# Patient Record
Sex: FEMALE | Race: BLACK OR AFRICAN AMERICAN | NOT HISPANIC OR LATINO | URBAN - METROPOLITAN AREA
[De-identification: names, ages, dates, MRNs, and addresses within clinical notes are randomized per-mention and may not be internally consistent; named-entity substitution may affect disease eponyms.]

---

## 2017-04-06 ENCOUNTER — OUTPATIENT (OUTPATIENT)
Dept: OUTPATIENT SERVICES | Facility: HOSPITAL | Age: 32
LOS: 1 days | Discharge: HOME | End: 2017-04-06

## 2017-06-27 DIAGNOSIS — B20 HUMAN IMMUNODEFICIENCY VIRUS [HIV] DISEASE: ICD-10-CM

## 2017-06-29 ENCOUNTER — OUTPATIENT (OUTPATIENT)
Dept: OUTPATIENT SERVICES | Facility: HOSPITAL | Age: 32
LOS: 1 days | Discharge: HOME | End: 2017-06-29

## 2017-06-29 DIAGNOSIS — Z21 ASYMPTOMATIC HUMAN IMMUNODEFICIENCY VIRUS [HIV] INFECTION STATUS: ICD-10-CM

## 2017-06-29 DIAGNOSIS — B20 HUMAN IMMUNODEFICIENCY VIRUS [HIV] DISEASE: ICD-10-CM

## 2017-09-07 ENCOUNTER — RESULT REVIEW (OUTPATIENT)
Age: 32
End: 2017-09-07

## 2017-09-19 ENCOUNTER — EMERGENCY (EMERGENCY)
Facility: HOSPITAL | Age: 32
LOS: 0 days | Discharge: HOME | End: 2017-09-19
Admitting: INTERNAL MEDICINE
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
## 2017-09-19 DIAGNOSIS — S01.111A LACERATION WITHOUT FOREIGN BODY OF RIGHT EYELID AND PERIOCULAR AREA, INITIAL ENCOUNTER: ICD-10-CM
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
## 2017-09-19 DIAGNOSIS — Z21 ASYMPTOMATIC HUMAN IMMUNODEFICIENCY VIRUS [HIV] INFECTION STATUS: ICD-10-CM

## 2017-09-19 DIAGNOSIS — Z91.013 ALLERGY TO SEAFOOD: ICD-10-CM

## 2017-09-19 DIAGNOSIS — Z23 ENCOUNTER FOR IMMUNIZATION: ICD-10-CM

## 2017-09-19 DIAGNOSIS — Z88.0 ALLERGY STATUS TO PENICILLIN: ICD-10-CM
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
## 2017-09-25 ENCOUNTER — EMERGENCY (EMERGENCY)
Facility: HOSPITAL | Age: 32
LOS: 0 days | Discharge: HOME | End: 2017-09-25

## 2017-09-25 DIAGNOSIS — S01.111D LACERATION WITHOUT FOREIGN BODY OF RIGHT EYELID AND PERIOCULAR AREA, SUBSEQUENT ENCOUNTER: ICD-10-CM

## 2017-09-25 DIAGNOSIS — Z21 ASYMPTOMATIC HUMAN IMMUNODEFICIENCY VIRUS [HIV] INFECTION STATUS: ICD-10-CM

## 2017-09-25 DIAGNOSIS — Z88.0 ALLERGY STATUS TO PENICILLIN: ICD-10-CM

## 2017-09-25 DIAGNOSIS — N34.2 OTHER URETHRITIS: ICD-10-CM
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
## 2017-09-25 DIAGNOSIS — Z91.013 ALLERGY TO SEAFOOD: ICD-10-CM

## 2017-09-25 DIAGNOSIS — Z88.2 ALLERGY STATUS TO SULFONAMIDES: ICD-10-CM

## 2018-02-10 ENCOUNTER — EMERGENCY (EMERGENCY)
Facility: HOSPITAL | Age: 33
LOS: 1 days | Discharge: HOME | End: 2018-02-10

## 2018-02-10 VITALS
OXYGEN SATURATION: 99 % | SYSTOLIC BLOOD PRESSURE: 110 MMHG | RESPIRATION RATE: 18 BRPM | HEART RATE: 83 BPM | DIASTOLIC BLOOD PRESSURE: 73 MMHG | TEMPERATURE: 98 F

## 2018-02-10 DIAGNOSIS — R09.81 NASAL CONGESTION: ICD-10-CM

## 2018-03-01 ENCOUNTER — RESULT REVIEW (OUTPATIENT)
Age: 33
End: 2018-03-01

## 2018-03-20 ENCOUNTER — EMERGENCY (EMERGENCY)
Facility: HOSPITAL | Age: 33
LOS: 0 days | Discharge: HOME | End: 2018-03-20
Attending: EMERGENCY MEDICINE | Admitting: INTERNAL MEDICINE

## 2018-03-20 VITALS
HEART RATE: 73 BPM | DIASTOLIC BLOOD PRESSURE: 74 MMHG | SYSTOLIC BLOOD PRESSURE: 109 MMHG | OXYGEN SATURATION: 100 % | TEMPERATURE: 99 F | RESPIRATION RATE: 18 BRPM

## 2018-03-20 DIAGNOSIS — R07.89 OTHER CHEST PAIN: ICD-10-CM
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
## 2018-03-20 DIAGNOSIS — M94.0 CHONDROCOSTAL JUNCTION SYNDROME [TIETZE]: ICD-10-CM
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
## 2018-03-20 DIAGNOSIS — Z88.0 ALLERGY STATUS TO PENICILLIN: ICD-10-CM

## 2018-03-20 DIAGNOSIS — Z91.013 ALLERGY TO SEAFOOD: ICD-10-CM
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
## 2018-03-20 RX ORDER — METHOCARBAMOL 500 MG/1
1 TABLET, FILM COATED ORAL
Qty: 21 | Refills: 0
Start: 2018-03-20 | End: 2018-03-26

## 2018-03-20 RX ORDER — IBUPROFEN 200 MG
600 TABLET ORAL ONCE
Qty: 0 | Refills: 0 | Status: COMPLETED | OUTPATIENT
Start: 2018-03-20 | End: 2018-03-20

## 2018-03-20 RX ORDER — IBUPROFEN 200 MG
1 TABLET ORAL
Qty: 21 | Refills: 0
Start: 2018-03-20 | End: 2018-03-26

## 2018-03-20 RX ADMIN — Medication 600 MILLIGRAM(S): at 19:26

## 2018-03-20 NOTE — ED PROVIDER NOTE - NS ED ROS FT
Cardiac:  + CP , No SOB or edema. No chest pain with exertion.  Respiratory:  No cough or respiratory distress. No hemoptysis. No history of asthma or RAD.  GI:  No nausea, vomiting, diarrhea or abdominal pain.  MS:  No myalgia, muscle weakness, joint pain or back pain.  Neuro:  No headache or weakness.  No LOC.  Skin:  No skin rash.   Endocrine: No history of thyroid disease or diabetes.  Except as documented in the HPI,  all other systems are negative.

## 2018-03-20 NOTE — ED PROVIDER NOTE - PHYSICAL EXAMINATION
VITAL SIGNS: I have reviewed nursing notes and confirm.  CONSTITUTIONAL: Well-developed; well-nourished; in no acute distress.   SKIN: skin exam is warm and dry, no acute rash.    HEAD: Normocephalic; atraumatic.  EYES: conjunctiva and sclera clear.  ENT: No nasal discharge; airway clear.  CARD: S1, S2 normal; no murmurs, gallops, or rubs. Regular rate and rhythm.   RESP: Bilateral breath sounds, No wheezes, rales or rhonchi.  EXT: Normal ROM.  No clubbing, cyanosis or edema.   NEURO: Alert, oriented, grossly unremarkable  .

## 2018-03-20 NOTE — ED PROVIDER NOTE - OBJECTIVE STATEMENT
Pt is a 33y/o female with no sig pmhx presents today c/o midsternal, dull, non-radiating constant chest discomfort x 2 days. 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 Pt sts her pain is worsened with movement. Pt denies pleurisy, leg swelling, OCP use, recent long travel, recent surgery, hx of clots, family hx of Heart disease

## 2018-03-20 NOTE — ED PROVIDER NOTE - ATTENDING CONTRIBUTION TO CARE
31 yo female without significant PMH c/o anterior chest wall pain for 2 days, worse with movement, non-radiating, not associated with any symptoms.  Started a new job  1 week ago. 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  Denies any tobacco, alcohol, drug use, no hormonal therapy, no personal or family h/o PE/DVT, no PE/DVT risk factors, no recent illness, no change in exercise tolerance.  Well-appearing young female, NAD. pain reproducible with movemetn, rest of exam unremarkable, ECG and CXR WNL.  Instructed to follow up with PMD, take NSAIDS, strict return precautions given,.  patient verbalized understanding and is amenable with the plan.

## 2018-03-20 NOTE — ED ADULT NURSE NOTE - CHPI ED SYMPTOMS NEG
no cough/no nausea/no syncope/no chills/no dizziness/no vomiting/no fever/no diaphoresis/no shortness of breath

## 2018-05-09 ENCOUNTER — OUTPATIENT (OUTPATIENT)
Dept: OUTPATIENT SERVICES | Facility: HOSPITAL | Age: 33
LOS: 1 days | Discharge: HOME | End: 2018-05-09

## 2018-05-09 DIAGNOSIS — B20 HUMAN IMMUNODEFICIENCY VIRUS [HIV] DISEASE: ICD-10-CM

## 2018-08-11 ENCOUNTER — EMERGENCY (EMERGENCY)
Facility: HOSPITAL | Age: 33
LOS: 0 days | Discharge: HOME | End: 2018-08-11
Attending: STUDENT IN AN ORGANIZED HEALTH CARE EDUCATION/TRAINING PROGRAM | Admitting: STUDENT IN AN ORGANIZED HEALTH CARE EDUCATION/TRAINING PROGRAM

## 2018-08-11 VITALS — WEIGHT: 160.28 LBS | HEIGHT: 66 IN

## 2018-08-11 VITALS
SYSTOLIC BLOOD PRESSURE: 108 MMHG | OXYGEN SATURATION: 99 % | HEART RATE: 79 BPM | TEMPERATURE: 98 F | DIASTOLIC BLOOD PRESSURE: 66 MMHG | RESPIRATION RATE: 18 BRPM

## 2018-08-11 DIAGNOSIS — R10.9 UNSPECIFIED ABDOMINAL PAIN: ICD-10-CM

## 2018-08-11 DIAGNOSIS — Z79.899 OTHER LONG TERM (CURRENT) DRUG THERAPY: ICD-10-CM

## 2018-08-11 DIAGNOSIS — Z88.0 ALLERGY STATUS TO PENICILLIN: ICD-10-CM

## 2018-08-11 DIAGNOSIS — M79.651 PAIN IN RIGHT THIGH: ICD-10-CM

## 2018-08-11 LAB
ALBUMIN SERPL ELPH-MCNC: 4.2 G/DL — SIGNIFICANT CHANGE UP (ref 3.5–5.2)
ALP SERPL-CCNC: 54 U/L — SIGNIFICANT CHANGE UP (ref 30–115)
ALT FLD-CCNC: 13 U/L — SIGNIFICANT CHANGE UP (ref 0–41)
ANION GAP SERPL CALC-SCNC: 14 MMOL/L — SIGNIFICANT CHANGE UP (ref 7–14)
APPEARANCE UR: ABNORMAL
AST SERPL-CCNC: 14 U/L — SIGNIFICANT CHANGE UP (ref 0–41)
BACTERIA # UR AUTO: ABNORMAL /HPF
BASOPHILS # BLD AUTO: 0.04 K/UL — SIGNIFICANT CHANGE UP (ref 0–0.2)
BASOPHILS NFR BLD AUTO: 0.5 % — SIGNIFICANT CHANGE UP (ref 0–1)
BILIRUB SERPL-MCNC: 0.4 MG/DL — SIGNIFICANT CHANGE UP (ref 0.2–1.2)
BILIRUB UR-MCNC: NEGATIVE — SIGNIFICANT CHANGE UP
BUN SERPL-MCNC: 14 MG/DL — SIGNIFICANT CHANGE UP (ref 10–20)
CALCIUM SERPL-MCNC: 9.2 MG/DL — SIGNIFICANT CHANGE UP (ref 8.5–10.1)
CHLORIDE SERPL-SCNC: 98 MMOL/L — SIGNIFICANT CHANGE UP (ref 98–110)
CO2 SERPL-SCNC: 24 MMOL/L — SIGNIFICANT CHANGE UP (ref 17–32)
COLOR SPEC: YELLOW — SIGNIFICANT CHANGE UP
CREAT SERPL-MCNC: 0.8 MG/DL — SIGNIFICANT CHANGE UP (ref 0.7–1.5)
DIFF PNL FLD: ABNORMAL
EOSINOPHIL # BLD AUTO: 0.08 K/UL — SIGNIFICANT CHANGE UP (ref 0–0.7)
EOSINOPHIL NFR BLD AUTO: 1 % — SIGNIFICANT CHANGE UP (ref 0–8)
EPI CELLS # UR: ABNORMAL /HPF
GLUCOSE SERPL-MCNC: 99 MG/DL — SIGNIFICANT CHANGE UP (ref 70–99)
GLUCOSE UR QL: NEGATIVE MG/DL — SIGNIFICANT CHANGE UP
HCG UR QL: NEGATIVE — SIGNIFICANT CHANGE UP
HCT VFR BLD CALC: 33.7 % — LOW (ref 37–47)
HGB BLD-MCNC: 11.4 G/DL — LOW (ref 12–16)
IMM GRANULOCYTES NFR BLD AUTO: 0.4 % — HIGH (ref 0.1–0.3)
KETONES UR-MCNC: NEGATIVE — SIGNIFICANT CHANGE UP
LEUKOCYTE ESTERASE UR-ACNC: NEGATIVE — SIGNIFICANT CHANGE UP
LYMPHOCYTES # BLD AUTO: 2.15 K/UL — SIGNIFICANT CHANGE UP (ref 1.2–3.4)
LYMPHOCYTES # BLD AUTO: 27.8 % — SIGNIFICANT CHANGE UP (ref 20.5–51.1)
MCHC RBC-ENTMCNC: 31.2 PG — HIGH (ref 27–31)
MCHC RBC-ENTMCNC: 33.8 G/DL — SIGNIFICANT CHANGE UP (ref 32–37)
MCV RBC AUTO: 92.3 FL — SIGNIFICANT CHANGE UP (ref 81–99)
MONOCYTES # BLD AUTO: 0.57 K/UL — SIGNIFICANT CHANGE UP (ref 0.1–0.6)
MONOCYTES NFR BLD AUTO: 7.4 % — SIGNIFICANT CHANGE UP (ref 1.7–9.3)
NEUTROPHILS # BLD AUTO: 4.87 K/UL — SIGNIFICANT CHANGE UP (ref 1.4–6.5)
NEUTROPHILS NFR BLD AUTO: 62.9 % — SIGNIFICANT CHANGE UP (ref 42.2–75.2)
NITRITE UR-MCNC: NEGATIVE — SIGNIFICANT CHANGE UP
NRBC # BLD: 0 /100 WBCS — SIGNIFICANT CHANGE UP (ref 0–0)
PH UR: 6 — SIGNIFICANT CHANGE UP (ref 5–8)
PLATELET # BLD AUTO: 270 K/UL — SIGNIFICANT CHANGE UP (ref 130–400)
POTASSIUM SERPL-MCNC: 3.9 MMOL/L — SIGNIFICANT CHANGE UP (ref 3.5–5)
POTASSIUM SERPL-SCNC: 3.9 MMOL/L — SIGNIFICANT CHANGE UP (ref 3.5–5)
PROT SERPL-MCNC: 7 G/DL — SIGNIFICANT CHANGE UP (ref 6–8)
PROT UR-MCNC: NEGATIVE MG/DL — SIGNIFICANT CHANGE UP
RBC # BLD: 3.65 M/UL — LOW (ref 4.2–5.4)
RBC # FLD: 11.1 % — LOW (ref 11.5–14.5)
RBC CASTS # UR COMP ASSIST: SIGNIFICANT CHANGE UP /HPF
SODIUM SERPL-SCNC: 136 MMOL/L — SIGNIFICANT CHANGE UP (ref 135–146)
SP GR SPEC: 1.02 — SIGNIFICANT CHANGE UP (ref 1.01–1.03)
UROBILINOGEN FLD QL: 0.2 MG/DL — SIGNIFICANT CHANGE UP (ref 0.2–0.2)
WBC # BLD: 7.74 K/UL — SIGNIFICANT CHANGE UP (ref 4.8–10.8)
WBC # FLD AUTO: 7.74 K/UL — SIGNIFICANT CHANGE UP (ref 4.8–10.8)

## 2018-08-11 RX ORDER — METHOCARBAMOL 500 MG/1
4 TABLET, FILM COATED ORAL
Qty: 112 | Refills: 0
Start: 2018-08-11 | End: 2018-08-24

## 2018-08-11 RX ORDER — IBUPROFEN 200 MG
600 TABLET ORAL ONCE
Qty: 0 | Refills: 0 | Status: COMPLETED | OUTPATIENT
Start: 2018-08-11 | End: 2018-08-11

## 2018-08-11 RX ADMIN — Medication 600 MILLIGRAM(S): at 10:40

## 2018-08-11 RX ADMIN — Medication 600 MILLIGRAM(S): at 11:25

## 2018-08-11 NOTE — ED PROVIDER NOTE - ATTENDING CONTRIBUTION TO CARE
34 y/o F no sig pmh p/w R sided atraumatic back pain at posterior iliac crest.  + some pain radiating down buttock and into posterior leg.  No  sx, fever, weakness or numbness. pain worse w/ movement, better in some position. ROS PE above.  no spinal ttp, no cvat; pain to area indicated w/ position change. neuro non focal.  IMP: MSK pain.  analgesia, reassess.

## 2018-08-11 NOTE — ED PROVIDER NOTE - NS ED ROS FT
GEN:  no fever, no chills, no fatigue  NEURO:  no headache, no dizziness  ENT: no sore throat, no runny nose  CV:  no chest pain, no SOB  RESP:  no dyspnea, no cough  GI:  no nausea, no vomiting, no abdominal pain, no diarrhea, no constipation  :  no dysuria, no urinary frequency, no hematuria  MSK:  no joint pain, no edema  SKIN:  no rash, no bruising  HEME: no hematochezia, no melena

## 2018-08-11 NOTE — ED ADULT NURSE NOTE - CHPI ED NUR SYMPTOMS NEG
no dizziness/no nausea/no chills/no decreased eating/drinking/no weakness/no vomiting/no tingling/no fever

## 2018-08-11 NOTE — ED PROVIDER NOTE - PHYSICAL EXAMINATION
CONSTITUTIONAL: well developed, nontoxic appearing, in no acute distress, speaking in full sentences  SKIN: warm, dry, no rash, cap refill < 2 seconds  HEENT: normocephalic, atraumatic, no conjunctival erythema, moist mucous membranes, patent airway  NECK: supple, no masses  CV:  regular rate, regular rhythm, no murmur, 2+ radial and DP pulses bilaterally  RESP: no wheezes, no rales, no rhonchi, normal work of breathing  BACK: no CVA tenderness  ABD: soft, nontender, nondistended, no rebound, no guarding, normoactive bowel sounds, negative obturators sign, negative psoas sign  MSK: normal ROM, no cyanosis, no edema  NEURO: alert, oriented, grossly unremarkable  PSYCH: cooperative, appropriate

## 2018-08-11 NOTE — ED PROVIDER NOTE - OBJECTIVE STATEMENT
34 yo F with no PMHx who presents with gradual onset of constant, severe, dull R sided flank/hip pain radiating down leg x 4 days. Pt unsure if she slept on her child's toy at night. Pain worse with bending over or taking deep breaths, improved with motrin. No fever, nausea, vomiting, dysuria, diarrhea, constipation, blood in stool. Currently menstruating. No OCP use, recent travel, heavy lifting, trauma. No hx of abd surgeries. 34 yo F with no PMHx who presents with gradual onset of constant, severe, dull R sided flank/hip pain radiating down leg x 4 days. Pain worse with bending over or taking deep breaths, improved with motrin. No fever, nausea, vomiting, dysuria, diarrhea, constipation, blood in stool. Currently menstruating. Pt unsure if she slept on her child's toy at night. No OCP use, recent travel, heavy lifting, trauma. No hx of abd surgeries.

## 2018-08-11 NOTE — ED PROVIDER NOTE - MEDICAL DECISION MAKING DETAILS
Pain improved in ED.  Labs reassuring,  consider MSK.  Pt stable for dc w/ PMD f/up, and care as discussed.  MM relaxant sent to pharmacy.  Pt/ family understands plan and signs and symptoms for ED return.

## 2018-08-11 NOTE — ED PROVIDER NOTE - PROGRESS NOTE DETAILS
32 yo F presenting with R hip/flank pain. No tenderness on exam. Less likely secondary to appendicitis, ovarian torsion, ectopic pregnancy, psoas abscess, pyelonephritis. Possibly kidney stone vs musculoskeletal etiology. Ordered labs, UA, analgesia. 34 yo F presenting with R hip/flank pain. No tenderness on exam. Less likely secondary to appendicitis, ovarian torsion, ectopic pregnancy, psoas abscess, pyelonephritis, PE, diverticulitis, colitis. Possibly kidney stone vs musculoskeletal etiology. Ordered labs, UA, analgesia. Labs neg, UA neg, upreg neg. Pt reports pain improved after ibuprofen. Educated pt on taking NSAIDs and given script for 2 week supply of robaxin. Pt agreeable with plan to dc home and will follow up with her PCP as needed. 34 yo F presenting with R hip/flank pain. No tenderness on exam. Less likely secondary to appendicitis, ovarian torsion, ectopic pregnancy, psoas abscess, pyelonephritis, PE, diverticulitis, colitis, sciatic, radiculopathy. Possibly kidney stone vs musculoskeletal etiology. Ordered labs, UA, analgesia.

## 2018-08-12 LAB
CULTURE RESULTS: SIGNIFICANT CHANGE UP
SPECIMEN SOURCE: SIGNIFICANT CHANGE UP

## 2018-09-27 ENCOUNTER — OUTPATIENT (OUTPATIENT)
Dept: OUTPATIENT SERVICES | Facility: HOSPITAL | Age: 33
LOS: 1 days | Discharge: HOME | End: 2018-09-27

## 2018-09-27 DIAGNOSIS — B20 HUMAN IMMUNODEFICIENCY VIRUS [HIV] DISEASE: ICD-10-CM

## 2019-03-14 ENCOUNTER — OUTPATIENT (OUTPATIENT)
Dept: OUTPATIENT SERVICES | Facility: HOSPITAL | Age: 34
LOS: 1 days | Discharge: HOME | End: 2019-03-14

## 2019-03-14 DIAGNOSIS — B20 HUMAN IMMUNODEFICIENCY VIRUS [HIV] DISEASE: ICD-10-CM

## 2019-04-01 ENCOUNTER — EMERGENCY (EMERGENCY)
Facility: HOSPITAL | Age: 34
LOS: 0 days | Discharge: HOME | End: 2019-04-01
Attending: EMERGENCY MEDICINE | Admitting: EMERGENCY MEDICINE
Payer: MEDICAID

## 2019-04-01 ENCOUNTER — OUTPATIENT (OUTPATIENT)
Dept: OUTPATIENT SERVICES | Facility: HOSPITAL | Age: 34
LOS: 1 days | End: 2019-04-01
Payer: MEDICAID

## 2019-04-01 VITALS — HEIGHT: 66 IN | WEIGHT: 160.06 LBS

## 2019-04-01 VITALS
DIASTOLIC BLOOD PRESSURE: 58 MMHG | HEART RATE: 74 BPM | RESPIRATION RATE: 18 BRPM | OXYGEN SATURATION: 100 % | TEMPERATURE: 98 F | SYSTOLIC BLOOD PRESSURE: 118 MMHG

## 2019-04-01 DIAGNOSIS — R07.89 OTHER CHEST PAIN: ICD-10-CM

## 2019-04-01 DIAGNOSIS — M54.5 LOW BACK PAIN: ICD-10-CM

## 2019-04-01 DIAGNOSIS — R53.83 OTHER FATIGUE: ICD-10-CM

## 2019-04-01 DIAGNOSIS — R20.2 PARESTHESIA OF SKIN: ICD-10-CM

## 2019-04-01 DIAGNOSIS — R63.0 ANOREXIA: ICD-10-CM

## 2019-04-01 DIAGNOSIS — M79.602 PAIN IN LEFT ARM: ICD-10-CM

## 2019-04-01 DIAGNOSIS — R20.0 ANESTHESIA OF SKIN: ICD-10-CM

## 2019-04-01 DIAGNOSIS — Z79.1 LONG TERM (CURRENT) USE OF NON-STEROIDAL ANTI-INFLAMMATORIES (NSAID): ICD-10-CM

## 2019-04-01 DIAGNOSIS — Z91.013 ALLERGY TO SEAFOOD: ICD-10-CM

## 2019-04-01 DIAGNOSIS — M54.2 CERVICALGIA: ICD-10-CM

## 2019-04-01 DIAGNOSIS — E78.5 HYPERLIPIDEMIA, UNSPECIFIED: ICD-10-CM

## 2019-04-01 DIAGNOSIS — Z88.0 ALLERGY STATUS TO PENICILLIN: ICD-10-CM

## 2019-04-01 LAB
ALBUMIN SERPL ELPH-MCNC: 4.1 G/DL — SIGNIFICANT CHANGE UP (ref 3.5–5.2)
ALP SERPL-CCNC: 64 U/L — SIGNIFICANT CHANGE UP (ref 30–115)
ALT FLD-CCNC: 19 U/L — SIGNIFICANT CHANGE UP (ref 0–41)
ANION GAP SERPL CALC-SCNC: 10 MMOL/L — SIGNIFICANT CHANGE UP (ref 7–14)
AST SERPL-CCNC: 16 U/L — SIGNIFICANT CHANGE UP (ref 0–41)
BASOPHILS # BLD AUTO: 0.03 K/UL — SIGNIFICANT CHANGE UP (ref 0–0.2)
BASOPHILS NFR BLD AUTO: 0.5 % — SIGNIFICANT CHANGE UP (ref 0–1)
BILIRUB SERPL-MCNC: 0.3 MG/DL — SIGNIFICANT CHANGE UP (ref 0.2–1.2)
BUN SERPL-MCNC: 14 MG/DL — SIGNIFICANT CHANGE UP (ref 10–20)
CALCIUM SERPL-MCNC: 9.2 MG/DL — SIGNIFICANT CHANGE UP (ref 8.5–10.1)
CHLORIDE SERPL-SCNC: 102 MMOL/L — SIGNIFICANT CHANGE UP (ref 98–110)
CO2 SERPL-SCNC: 23 MMOL/L — SIGNIFICANT CHANGE UP (ref 17–32)
CREAT SERPL-MCNC: 0.8 MG/DL — SIGNIFICANT CHANGE UP (ref 0.7–1.5)
EOSINOPHIL # BLD AUTO: 0.16 K/UL — SIGNIFICANT CHANGE UP (ref 0–0.7)
EOSINOPHIL NFR BLD AUTO: 2.4 % — SIGNIFICANT CHANGE UP (ref 0–8)
GLUCOSE SERPL-MCNC: 102 MG/DL — HIGH (ref 70–99)
HCG SERPL QL: NEGATIVE — SIGNIFICANT CHANGE UP
HCT VFR BLD CALC: 34.1 % — LOW (ref 37–47)
HGB BLD-MCNC: 11.4 G/DL — LOW (ref 12–16)
IMM GRANULOCYTES NFR BLD AUTO: 0.5 % — HIGH (ref 0.1–0.3)
LIDOCAIN IGE QN: 67 U/L — HIGH (ref 7–60)
LYMPHOCYTES # BLD AUTO: 2.28 K/UL — SIGNIFICANT CHANGE UP (ref 1.2–3.4)
LYMPHOCYTES # BLD AUTO: 34.8 % — SIGNIFICANT CHANGE UP (ref 20.5–51.1)
MAGNESIUM SERPL-MCNC: 1.8 MG/DL — SIGNIFICANT CHANGE UP (ref 1.8–2.4)
MCHC RBC-ENTMCNC: 30.5 PG — SIGNIFICANT CHANGE UP (ref 27–31)
MCHC RBC-ENTMCNC: 33.4 G/DL — SIGNIFICANT CHANGE UP (ref 32–37)
MCV RBC AUTO: 91.2 FL — SIGNIFICANT CHANGE UP (ref 81–99)
MONOCYTES # BLD AUTO: 0.51 K/UL — SIGNIFICANT CHANGE UP (ref 0.1–0.6)
MONOCYTES NFR BLD AUTO: 7.8 % — SIGNIFICANT CHANGE UP (ref 1.7–9.3)
NEUTROPHILS # BLD AUTO: 3.54 K/UL — SIGNIFICANT CHANGE UP (ref 1.4–6.5)
NEUTROPHILS NFR BLD AUTO: 54 % — SIGNIFICANT CHANGE UP (ref 42.2–75.2)
NRBC # BLD: 0 /100 WBCS — SIGNIFICANT CHANGE UP (ref 0–0)
PLATELET # BLD AUTO: 287 K/UL — SIGNIFICANT CHANGE UP (ref 130–400)
POTASSIUM SERPL-MCNC: 3.7 MMOL/L — SIGNIFICANT CHANGE UP (ref 3.5–5)
POTASSIUM SERPL-SCNC: 3.7 MMOL/L — SIGNIFICANT CHANGE UP (ref 3.5–5)
PROT SERPL-MCNC: 7.2 G/DL — SIGNIFICANT CHANGE UP (ref 6–8)
RBC # BLD: 3.74 M/UL — LOW (ref 4.2–5.4)
RBC # FLD: 11.6 % — SIGNIFICANT CHANGE UP (ref 11.5–14.5)
SODIUM SERPL-SCNC: 135 MMOL/L — SIGNIFICANT CHANGE UP (ref 135–146)
TROPONIN T SERPL-MCNC: <0.01 NG/ML — SIGNIFICANT CHANGE UP
WBC # BLD: 6.55 K/UL — SIGNIFICANT CHANGE UP (ref 4.8–10.8)
WBC # FLD AUTO: 6.55 K/UL — SIGNIFICANT CHANGE UP (ref 4.8–10.8)

## 2019-04-01 PROCEDURE — 99285 EMERGENCY DEPT VISIT HI MDM: CPT

## 2019-04-01 PROCEDURE — G9001: CPT

## 2019-04-01 PROCEDURE — 71045 X-RAY EXAM CHEST 1 VIEW: CPT | Mod: 26

## 2019-04-01 RX ORDER — SODIUM CHLORIDE 9 MG/ML
1000 INJECTION INTRAMUSCULAR; INTRAVENOUS; SUBCUTANEOUS ONCE
Qty: 0 | Refills: 0 | Status: COMPLETED | OUTPATIENT
Start: 2019-04-01 | End: 2019-04-01

## 2019-04-01 RX ORDER — KETOROLAC TROMETHAMINE 30 MG/ML
30 SYRINGE (ML) INJECTION ONCE
Qty: 0 | Refills: 0 | Status: DISCONTINUED | OUTPATIENT
Start: 2019-04-01 | End: 2019-04-01

## 2019-04-01 RX ORDER — FAMOTIDINE 10 MG/ML
20 INJECTION INTRAVENOUS ONCE
Qty: 0 | Refills: 0 | Status: DISCONTINUED | OUTPATIENT
Start: 2019-04-01 | End: 2019-04-01

## 2019-04-01 RX ADMIN — SODIUM CHLORIDE 1000 MILLILITER(S): 9 INJECTION INTRAMUSCULAR; INTRAVENOUS; SUBCUTANEOUS at 20:57

## 2019-04-01 RX ADMIN — Medication 30 MILLIGRAM(S): at 20:57

## 2019-04-01 RX ADMIN — Medication 30 MILLIGRAM(S): at 21:15

## 2019-04-01 NOTE — ED ADULT NURSE NOTE - CHPI ED NUR SYMPTOMS NEG
no dizziness/no syncope/no fever/no vomiting/no congestion/no back pain/no chills/no nausea/no shortness of breath/no diaphoresis

## 2019-04-01 NOTE — ED PROVIDER NOTE - PHYSICAL EXAMINATION
CONSTITUTIONAL: Well-developed; well-nourished; in no acute distress.   SKIN: warm, dry  HEAD: Normocephalic; atraumatic.  EYES: no conjunctival injection. PERRL.   ENT: No nasal discharge; airway clear.  NECK: Supple; non tender.  CARD: S1, S2 normal; no murmurs, gallops, or rubs. Regular rate and rhythm.   RESP: No wheezes, rales or rhonchi.  ABD: soft ntnd  EXT: +Spurling Maneuver, + should abduction relief test, Normal ROM.  No clubbing, cyanosis or edema.   LYMPH: No acute cervical adenopathy.  NEURO: Alert, oriented, CN 2-12 grossly in tact, 5/5 strength of RUE, 4/5 strength of LUE, 5/5 strength b/l LE,   PSYCH: Cooperative, appropriate. CONSTITUTIONAL: Well-developed; well-nourished; in no acute distress.   SKIN: warm, dry  HEAD: Normocephalic; atraumatic.  EYES: no conjunctival injection. PERRL.   ENT: No nasal discharge; airway clear.  NECK: Supple; non tender.  CARD: S1, S2 normal; no murmurs, gallops, or rubs. Regular rate and rhythm.   RESP: No wheezes, rales or rhonchi.  ABD: soft ntnd  EXT: +Spurling Maneuver, + should abduction relief test, Normal ROM.  No clubbing, cyanosis or edema.   LYMPH: No acute cervical adenopathy.  NEURO: Alert, oriented, CN 2-12 grossly in tact, 4/5 strength of LUE, 5/5 strength of RUE, 5/5 strength b/l LE  PSYCH: Cooperative, appropriate.

## 2019-04-01 NOTE — ED PROVIDER NOTE - CARE PROVIDER_API CALL
Navarro Weinstein)  Internal Medicine  [address] [place] [address]  Phone: [phone]  Fax: [phone]  Follow Up Time:

## 2019-04-01 NOTE — ED PROVIDER NOTE - ATTENDING CONTRIBUTION TO CARE
33F PMH HL not on meds, p/w cp and L arm heaviness. started sat evening at "excrutiating heart burn" which resolved after 8 hrs after taking zantac, but woke up the next day w LUE heaviness/pain/tingling. pt is concerned she might be having a heart attack. no longer has cp.  no abd pain, nvdc. no fever, cough. no trauma. no neck pain, ha. no numbness, weakness. no tearing bp. no le edema, le pain, immobilization, hormones, hemoptysis. feels tired, dec appetite. 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 no si, hi ,hallucinations. pmd dr barlow. no dysuria, freq, hematuria. never smoker. no fam hx cad or scd.     on exam, AFVSS, well jarrett nad, ncat, eomi, perrla, DRY mm, lctab, rrr nl s1s2 no mrg, abd soft ntnd, aaox3, CN 2-12 intact, No nystagmus.  5/5 motor x 4 ext, SILT x 4 extremities, No facial droop or slurred speech. No pronator drift.  Normal rapid alternating movement and finger nose finger bilaterally. No midline C/T/L tenderness to palpation or step off. Normal gait, No ataxia. , no le edema or calf ttp,     a/p;  CP/L pain, NV intact, low risk ACS. hx untreated HL. will do labs, ekg/trop, CXR, toradol, ivf, reeval. PERC neg. H&P n ot consistent w pe, dissection, esoph perf, tamponade, ptxn pna. r/o acs.

## 2019-04-01 NOTE — ED PROVIDER NOTE - NSFOLLOWUPINSTRUCTIONS_ED_ALL_ED_FT
Chest Pain    Chest pain can be caused by many different conditions which may or may not be dangerous. Causes include heartburn, lung infections, heart attack, blood clot in lungs, skin infections, strain or damage to muscle, cartilage, or bones, etc. In addition to a history and physical examination, an electrocardiogram (ECG) or other lab tests may have been performed to determine the cause of your chest pain. Follow up with your primary care provider or with a cardiologist as instructed.     SEEK IMMEDIATE MEDICAL CARE IF YOU HAVE ANY OF THE FOLLOWING SYMPTOMS: worsening chest pain, coughing up blood, unexplained back/neck/jaw pain, severe abdominal pain, dizziness or lightheadedness, fainting, shortness of breath, sweaty or clammy skin, vomiting, or racing heart beat. These symptoms may represent a serious problem that is an emergency. Do not wait to see if the symptoms will go away. Get medical help right away. Call 911 and do not drive yourself to the hospital.      Arm Pain    WHAT YOU NEED TO KNOW:    Your arm pain may be caused by a number of conditions. Examples include arthritis, nerve problems, or an awkward position while you sleep. X-rays did not show a broken bone in your arm or wrist. Arm pain may be a sign of a serious condition that needs immediate care, such as a heart attack.    DISCHARGE INSTRUCTIONS:    Call 911 for any of the following: You have any of the following signs of a heart attack:     Squeezing, pressure, or pain in your chest that lasts longer than 5 minutes or returns    Discomfort or pain in your back, neck, jaw, stomach, or arm     Trouble breathing or a fast, fluttery heartbeat    Nausea or vomiting    Lightheadedness or a sudden cold sweat, especially with chest pain or trouble breathing    Return to the emergency department if:     You have severe pain, or pain that spreads from your arm to other areas.    You have swelling, tingling, or numbness in your hand or fingers, or the skin turns blue.    You cannot move your arm.    Contact your healthcare provider if:     You have questions or concerns about your condition or care.    Medicines: You may need any of the following:     Prescription pain medicine may be given. Ask how to take this medicine safely.    NSAIDs, such as ibuprofen, help decrease swelling, pain, and fever. This medicine is available with or without a doctor's order. NSAIDs can cause stomach bleeding or kidney problems in certain people. If you take blood thinner medicine, always ask your healthcare provider if NSAIDs are safe for you. Always read the medicine label and follow directions.    Take your medicine as directed. Contact your healthcare provider if you think your medicine is not helping or if you have side effects. Tell him or her if you are allergic to any medicine. Keep a list of the medicines, vitamins, and herbs you take. Include the amounts, and when and why you take them. Bring the list or the pill bottles to follow-up visits. Carry your medicine list with you in case of an emergency.  Self-care:     Rest your arm as directed. A sling may be used to keep your arm from moving while it heals.    Apply ice as directed. Ice helps decrease pain and swelling. Ice may also help prevent tissue damage. Use an ice pack, or put crushed ice in a plastic bag. Cover it with a towel. Apply it to your arm for 20 minutes every few hours, or as directed. Ask how many times to apply ice each day, and for how many days.    Elevate your arm above the level of your heart as often as you can. This will help decrease swelling and pain. Prop your arm on pillows or blankets to keep the area elevated comfortably.    Adjust your position if you work in front of a computer. You may need arm or wrist supports or change the height of your chair.     Keep a pain record. Write down when your pain happens and how severe it is. Include any other symptoms you have with your pain. A record will help you keep track of pain cycles. Bring the record with you to your follow-up visits. It may also help your healthcare provider find out what is causing your pain.    Follow up with your healthcare provider as directed: You may need physical therapy. You may need to see an orthopedic specialist. Write down your questions so you remember to ask them during your visits.

## 2019-04-01 NOTE — ED PROVIDER NOTE - CLINICAL SUMMARY MEDICAL DECISION MAKING FREE TEXT BOX
pt feels better, ed work up negative, will dc home w pmd f/u 1-2 days, strict return precautions provded

## 2019-04-01 NOTE — ED PROVIDER NOTE - NS ED ROS FT
Review of Systems:  •	CONSTITUTIONAL - No fever, No diaphoresis, No weight change  •	SKIN - No rash  •	HEMATOLOGIC - No abnormal bleeding or bruising  •	EYES - No eye pain, No blurred vision  •	ENT - No change in hearing, No sore throat, + L neck numbness, No rhinorrhea, No ear pain  •	RESPIRATORY - No shortness of breath, No cough  •	CARDIAC -+ chest pain, No palpitations  •	GI - No abdominal pain, No nausea, No vomiting, No diarrhea, No constipation, No bright red blood per rectum or melena. No flank pain  •                 - No dysuria, frequency, hematuria.   •	ENDO - No polydypsia, No polyuria, No heat/cold intolerance  •	MUSCULOSKELETAL - No joint paint, No swelling, + lower back pain  •	NEUROLOGIC - + numbness of L forearm and fingertips , No focal weakness, No headache, No dizziness  All other systems negative, unless specified in HPI Review of Systems:  •	CONSTITUTIONAL - No fever, No diaphoresis, No weight change  •	SKIN - No rash  •	HEMATOLOGIC - No abnormal bleeding or bruising  •	EYES - No eye pain, No blurred vision  •	ENT - No change in hearing, No sore throat, + L neck numbness, No rhinorrhea, No ear pain  •	RESPIRATORY - No shortness of breath, No cough  •	CARDIAC -+ chest pain, No palpitations  •	GI - No abdominal pain, No nausea, No vomiting, No diarrhea, No constipation, No bright red blood per rectum or melena. No flank pain  •                 - No dysuria, frequency, hematuria.   •	ENDO - No polydypsia, No polyuria, No heat/cold intolerance  •	MUSCULOSKELETAL - + LUE neck & arm pain, No swelling, + lower back pain  •	NEUROLOGIC - No focal weakness, No headache, No dizziness  All other systems negative, unless specified in HPI

## 2019-04-01 NOTE — ED PROVIDER NOTE - OBJECTIVE STATEMENT
33 y o F with pmhx of HLD (no on medication) presents with 2 day history of numbness starting at L neck, radiating down to shoulder, underarm, forearm and fingertips.  Reports pain is constant and has been worsening since onset on Sunday.  Reports numbness is improved when arm is at her side while lying down and made worse when hanging down.   Patient also reports incident of epigastric pain on Saturday evening, lasting for 8hrs, radiating to chest, described as stabbing, similar but more intense than previous heartburn episodes, and improved with Zantac.  Patient also reports associated fatigue and weakness.  Denies fever, chills, SOB, abdominal pain, n/v/d/c, recent travel, bug bites.

## 2019-04-29 DIAGNOSIS — Z71.89 OTHER SPECIFIED COUNSELING: ICD-10-CM

## 2019-04-30 ENCOUNTER — EMERGENCY (EMERGENCY)
Facility: HOSPITAL | Age: 34
LOS: 0 days | Discharge: HOME | End: 2019-04-30
Admitting: EMERGENCY MEDICINE
Payer: MEDICAID

## 2019-04-30 VITALS
HEART RATE: 86 BPM | TEMPERATURE: 98 F | OXYGEN SATURATION: 98 % | DIASTOLIC BLOOD PRESSURE: 58 MMHG | SYSTOLIC BLOOD PRESSURE: 107 MMHG | RESPIRATION RATE: 18 BRPM

## 2019-04-30 DIAGNOSIS — Z88.0 ALLERGY STATUS TO PENICILLIN: ICD-10-CM

## 2019-04-30 DIAGNOSIS — J01.90 ACUTE SINUSITIS, UNSPECIFIED: ICD-10-CM

## 2019-04-30 DIAGNOSIS — Z91.013 ALLERGY TO SEAFOOD: ICD-10-CM

## 2019-04-30 PROCEDURE — 99283 EMERGENCY DEPT VISIT LOW MDM: CPT

## 2019-04-30 RX ORDER — PSEUDOEPHEDRINE HCL 30 MG
1 TABLET ORAL
Qty: 6 | Refills: 0
Start: 2019-04-30 | End: 2019-05-02

## 2019-04-30 RX ORDER — FLUTICASONE PROPIONATE 50 MCG
1 SPRAY, SUSPENSION NASAL
Qty: 10 | Refills: 0
Start: 2019-04-30 | End: 2019-05-06

## 2019-04-30 RX ORDER — IBUPROFEN 200 MG
1 TABLET ORAL
Qty: 28 | Refills: 0
Start: 2019-04-30 | End: 2019-05-06

## 2019-04-30 NOTE — ED PROVIDER NOTE - OBJECTIVE STATEMENT
32 yo F with pmhx of sinusitis presenting with nasal congestion, sinus pain, pressure-like headache x 3 days. Symptoms are moderate. No alleviating or aggravating factors. No cp, sob, fever, chills, abdominal pain, nausea, vomiting, diarrhea, back pain, urinary symptoms, headache, dizziness, paresthesias, or weakness.

## 2019-04-30 NOTE — ED PROVIDER NOTE - PHYSICAL EXAMINATION
VITAL SIGNS: I have reviewed nursing notes and confirm.  CONSTITUTIONAL: Well-developed; well-nourished; in no acute distress.  SKIN: Skin exam is warm and dry, no acute rash.  HEAD: Normocephalic; atraumatic.  EYES: PERRL, EOM intact; conjunctiva and sclera clear.  ENT: +Boggy turbinates; airway clear. +Sinus tenderness.   NECK: Supple; non tender.  CARD: S1, S2 normal; no murmurs, gallops, or rubs. Regular rate and rhythm.  RESP: Clear to auscultation bilaterally. No wheezes, rales or rhonchi.  ABD: Normal bowel sounds; soft; non-distended; non-tender.   EXT: Normal ROM. No edema.  LYMPH: No acute cervical adenopathy.  NEURO: Alert, oriented. Grossly unremarkable. No focal deficits.  PSYCH: Cooperative, appropriate.

## 2019-04-30 NOTE — ED PROVIDER NOTE - NSFOLLOWUPINSTRUCTIONS_ED_ALL_ED_FT
Sinusitis, Adult    Sinusitis is soreness and inflammation of your sinuses. Sinuses are hollow spaces in the bones around your face. Your sinuses are located:    Around your eyes.  In the middle of your forehead.  Behind your nose.  In your cheekbones.    Your sinuses and nasal passages are lined with a stringy fluid (mucus). Mucus normally drains out of your sinuses. When your nasal tissues become inflamed or swollen, mucus can become trapped or blocked. Blocked or trapped mucus makes it difficult for air to flow through your sinuses. This allows bacteria, viruses, and funguses to grow, which leads to infection. Most infections of the sinuses are caused by a virus.    Sinusitis can develop quickly. It can last for 7?10 days (acute) or for more than 12 weeks (chronic). Sinusitis often develops after a cold.    What are the causes?  This condition is caused by anything that creates swelling in the sinuses or stops mucus from draining, including:    Allergies.  Asthma.  Bacterial or viral infection.  Abnormally shaped bones between the nasal passages.  Nasal growths that contain mucus (nasal polyps).  Narrow sinus openings.  Pollutants, such as chemicals or irritants in the air.  A foreign object stuck in the nose.  A fungal infection. This is rare.    What increases the risk?  The following factors may make you more likely to develop this condition:    Having allergies or asthma.  Having had a recent cold or respiratory tract infection.  Having structural deformities or blockages in your nose or sinuses.  Having a weak immune system.  Doing a lot of swimming or diving.  Overusing nasal sprays.  Smoking.    What are the signs or symptoms?  The main symptoms of this condition are pain and a feeling of pressure around the affected sinuses. Other symptoms include:    Upper toothache.  Earache.  Headache.  Bad breath.  Decreased sense of smell and taste.  A cough that may get worse at night.  Fatigue.  Fever.  Thick drainage from your nose. The drainage is often green and it may contain pus (purulent).  Stuffy nose or congestion.  Postnasal drip. This is when extra mucus collects in the throat or back of the nose.  Swelling and warmth over the affected sinuses.  Sore throat.  Sensitivity to light.    How is this diagnosed?  This condition is diagnosed based on symptoms, a medical history, and a physical exam. To find out if your condition is acute or chronic, your health care provider may:    Look in your nose for signs of nasal polyps.  Tap over the affected sinus to check for signs of infection.  View the inside of your sinuses using an imaging device that has a light attached (endoscope).    If your health care provider suspects that you have chronic sinusitis, you may also:    Be tested for allergies.  Have a sample of mucus taken from your nose (nasal culture) and checked for bacteria.  Have a mucus sample examined to see if your sinusitis is related to an allergy.    If your sinusitis does not respond to treatment and it lasts longer than 8 weeks, you may have an MRI or CT scan to check your sinuses. These scans also help to determine how severe your infection is.    In rare cases, a bone biopsy may be done to rule out more serious types of fungal sinus disease.    How is this treated?  Treatment for sinusitis depends on the cause and whether your condition is chronic or acute. If a virus is causing your sinusitis, your symptoms will go away on their own within 10 days. You may be given medicines to relieve your symptoms, including:    Topical nasal decongestants. They shrink swollen nasal passages and let mucus drain from your sinuses.  Antihistamines. These drugs block inflammation that is triggered by allergies. This can help to ease swelling in your nose and sinuses.  Topical nasal corticosteroids. These are nasal sprays that ease inflammation and swelling in your nose and sinuses.  Nasal saline washes. These rinses can help to get rid of thick mucus in your nose.    If your condition is caused by bacteria, your health care provider may recommend waiting to see if your symptoms improve. Most bacterial infections will get better without antibiotic medicine. If you have a severe infection or a weak immune system, you may be prescribed antibiotics.    Surgery may be needed to correct underlying conditions, such as narrow nasal passages. Surgery may also be needed to remove polyps.    Follow these instructions at home:  Medicines     Take, use, or apply over-the-counter and prescription medicines only as told by your health care provider. These may include nasal sprays.  If you were prescribed an antibiotic, take it as told by your health care provider. Do not stop taking the antibiotic even if you start to feel better.  Hydrate and Humidify     Drink enough water to keep your urine clear or pale yellow. Staying hydrated will help to thin your mucus.  Use a cool mist humidifier to keep the humidity level in your home above 50%.  Inhale steam for 10–15 minutes, 3–4 times a day or as told by your health care provider. You can do this in the bathroom while a hot shower is running.  Limit your exposure to cool or dry air.  Rest     Rest as much as possible.  Sleep with your head raised (elevated).  Make sure to get enough sleep each night.  General instructions     Apply a warm, moist washcloth to your face 3–4 times a day or as told by your health care provider. This will help with discomfort.  Wash your hands often with soap and water to reduce your exposure to viruses and other germs. If soap and water are not available, use hand .  Do not smoke. Avoid being around people who are smoking (secondhand smoke).  Keep all follow-up visits as told by your health care provider. This is important.  Contact a health care provider if:  You have a fever.  Your symptoms get worse.  Your symptoms do not improve within 10 days.  Get help right away if:  You have a severe headache.  You have persistent vomiting.  You have pain or swelling around your face or eyes.  You have vision problems.  You develop confusion.  Your neck is stiff.  You have trouble breathing.  This information is not intended to replace advice given to you by your health care provider. Make sure you discuss any questions you have with your health care provider.

## 2019-04-30 NOTE — ED PROVIDER NOTE - CLINICAL SUMMARY MEDICAL DECISION MAKING FREE TEXT BOX
32 yo F with pmhx of sinusitis presenting with nasal congestion, sinus pain, pressure-like headache x 3 days. Will treat as sinusitis with doxycycline, flonase, sudafed, and motrin and fu with pmd. I have discussed the discharge plan with the patient. The patient agrees with the plan, as discussed.  The patient understands Emergency Department diagnosis is a preliminary diagnosis often based on limited information and that the patient must adhere to the follow-up plan as discussed.  The patient understands that if the symptoms worsen or if prescribed medications do not have the desired/planned effect that the patient may return to the Emergency Department at any time for further evaluation and treatment.

## 2019-04-30 NOTE — ED PROVIDER NOTE - NS ED ROS FT
Review of Systems:  	•	CONSTITUTIONAL - no fever, no diaphoresis, no chills  	•	SKIN - no rash  	•	HEMATOLOGIC - no bleeding, no bruising  	•	EYES - no eye pain, no blurry vision  	•	ENT - +sinus pain, +congestion, no ear pain  	•	RESPIRATORY - no shortness of breath, no cough  	•	CARDIAC - no chest pain, no palpitations  	•	GI - no abd pain, no nausea, no vomiting  	•	NEUROLOGIC - no weakness, +headache, no paresthesias, no LOC  	All other ROS are negative except as documented in HPI.

## 2019-08-14 ENCOUNTER — RESULT REVIEW (OUTPATIENT)
Age: 34
End: 2019-08-14

## 2019-10-01 ENCOUNTER — EMERGENCY (EMERGENCY)
Facility: HOSPITAL | Age: 34
LOS: 0 days | Discharge: LEFT BEFORE DISCHARGE | End: 2019-10-02
Attending: EMERGENCY MEDICINE | Admitting: EMERGENCY MEDICINE
Payer: MEDICAID

## 2019-10-01 ENCOUNTER — OUTPATIENT (OUTPATIENT)
Dept: OUTPATIENT SERVICES | Facility: HOSPITAL | Age: 34
LOS: 1 days | End: 2019-10-01

## 2019-10-01 VITALS
OXYGEN SATURATION: 100 % | SYSTOLIC BLOOD PRESSURE: 122 MMHG | DIASTOLIC BLOOD PRESSURE: 60 MMHG | WEIGHT: 160.06 LBS | HEART RATE: 73 BPM | TEMPERATURE: 99 F | RESPIRATION RATE: 18 BRPM

## 2019-10-01 DIAGNOSIS — O00.90 UNSPECIFIED ECTOPIC PREGNANCY WITHOUT INTRAUTERINE PREGNANCY: ICD-10-CM

## 2019-10-01 DIAGNOSIS — R33.9 RETENTION OF URINE, UNSPECIFIED: ICD-10-CM

## 2019-10-01 DIAGNOSIS — Z91.013 ALLERGY TO SEAFOOD: ICD-10-CM

## 2019-10-01 DIAGNOSIS — Z88.0 ALLERGY STATUS TO PENICILLIN: ICD-10-CM

## 2019-10-01 DIAGNOSIS — N83.519 TORSION OF OVARY AND OVARIAN PEDICLE, UNSPECIFIED SIDE: ICD-10-CM

## 2019-10-01 DIAGNOSIS — N93.8 OTHER SPECIFIED ABNORMAL UTERINE AND VAGINAL BLEEDING: ICD-10-CM

## 2019-10-01 LAB
APPEARANCE UR: CLEAR — SIGNIFICANT CHANGE UP
BILIRUB UR-MCNC: NEGATIVE — SIGNIFICANT CHANGE UP
COLOR SPEC: YELLOW — SIGNIFICANT CHANGE UP
DIFF PNL FLD: ABNORMAL
GLUCOSE UR QL: NEGATIVE — SIGNIFICANT CHANGE UP
KETONES UR-MCNC: ABNORMAL
LEUKOCYTE ESTERASE UR-ACNC: NEGATIVE — SIGNIFICANT CHANGE UP
NITRITE UR-MCNC: NEGATIVE — SIGNIFICANT CHANGE UP
PH UR: 6 — SIGNIFICANT CHANGE UP (ref 5–8)
PROT UR-MCNC: ABNORMAL
SP GR SPEC: 1.03 — HIGH (ref 1.01–1.02)
UROBILINOGEN FLD QL: ABNORMAL

## 2019-10-01 PROCEDURE — 99291 CRITICAL CARE FIRST HOUR: CPT

## 2019-10-01 PROCEDURE — 99283 EMERGENCY DEPT VISIT LOW MDM: CPT

## 2019-10-01 NOTE — ED PROVIDER NOTE - DURATION
Ventricular Rate : 111  Atrial Rate : 111  P-R Interval : 120  QRS Duration : 118  Q-T Interval : 402  QTC Calculation(Bezet) : 546  P Axis : 58  R Axis : -42  T Axis : 55  Diagnosis : Sinus tachycardia  Left axis deviation  Right bundle branch block  Abnormal ECG  When compared with ECG of 24-DEC-1996 06:51,  Vent. rate has increased BY  43 BPM  Right bundle branch block is now Present  Confirmed by LUIS RUSSELL, JDOI ([id number]) on 7/30/2019 4:16:34 PM   day(s)

## 2019-10-01 NOTE — ED ADULT TRIAGE NOTE - CHIEF COMPLAINT QUOTE
Patient states "it feels like a brick is laying on my bladder" states the last time she urinated was at 5pm. Patient denies pain states its pressure.

## 2019-10-01 NOTE — ED PROVIDER NOTE - ATTENDING CONTRIBUTION TO CARE
pt co pressure to urinary bladder region for 1 day. mild, non radiating. no modifying factors.  feels like she has to urinate at times but not much coming out. no fever, chills, n, v, d. LMP sts having it now.    pt in nad, ent nml neck sup cta, rrr, ab soft, nt, nd, no cvat.   will get labs, reassess.

## 2019-10-01 NOTE — ED PROVIDER NOTE - NSFOLLOWUPINSTRUCTIONS_ED_ALL_ED_FT
SEE DR GASPAR AS DISCUSSED TODAY!    Ectopic Pregnancy    WHAT YOU NEED TO KNOW:    Ectopic pregnancy occurs when a fertilized egg attaches and begins to grow outside of the uterus. The most common place for this to happen is in the fallopian tube. This is sometimes called a tubal pregnancy. The egg can also implant on the outside of the uterus, on the ovary or cervix, or in the abdomen. The egg may begin to grow, but the pregnancy cannot continue normally. Ectopic pregnancy can cause heavy bleeding and may be life-threatening.Ectopic Pregnancy         DISCHARGE INSTRUCTIONS:    Call your local emergency number (911 in the ) if:     You have chest pain or trouble breathing.        Call your doctor if:     You have sharp pain in your lower abdomen that is severe and starts suddenly.      You feel lightheaded or like you are going to faint.      You have increasing abdominal or pelvic pain or heavy vaginal bleeding.      You have shoulder pain.      You have a fever.      You have questions or concerns about your condition or care.    Medicines: You may need any of the following:     Prescription pain medicine may be given. Ask your healthcare provider how to take this medicine safely. Some prescription pain medicines contain acetaminophen. Do not take other medicines that contain acetaminophen without talking to your healthcare provider. Too much acetaminophen may cause liver damage. Prescription pain medicine may cause constipation. Ask your healthcare provider how to prevent or treat constipation.       Acetaminophen decreases pain and fever. It is available without a doctor's order. Ask how much to take and how often to take it. Follow directions. Read the labels of all other medicines you are using to see if they also contain acetaminophen, or ask your doctor or pharmacist. Acetaminophen can cause liver damage if not taken correctly. Do not use more than 4 grams (4,000 milligrams) total of acetaminophen in one day.       Take your medicine as directed. Contact your healthcare provider if you think your medicine is not helping or if you have side effects. Tell him or her if you are allergic to any medicine. Keep a list of the medicines, vitamins, and herbs you take. Include the amounts, and when and why you take them. Bring the list or the pill bottles to follow-up visits. Carry your medicine list with you in case of an emergency.    If you received methotrexate:     Do not have sex, and limit physical activity. Heavy physical activity or sexual intercourse may cause the ectopic pregnancy to rupture. This can be life-threatening. Your healthcare provider will tell you when it is okay to have sex and return to your normal activities.      Do not get pregnant until your healthcare provider says it is okay. Methotrexate will be harmful to an unborn baby. You will need to wait until at least 1 monthly cycle after you finish the methotrexate course to get pregnant. Your provider may want you to wait until after you have had 3 monthly cycles. This will help make sure all the methotrexate is out of your body.      Avoid folic acid and NSAID medicines. Folic acid, and NSAIDs, such as ibuprofen, prevent methotrexate from working correctly. Do not take folic acid supplements or have foods that are high in folic acid. Examples include orange juice, breakfast cereal, and leafy green vegetables. Your healthcare provider can give you more information on foods to avoid.      You may have some spotting and abdominal pain. This may start a few days after you begin taking methotrexate. These are normal and should only last a short time. Talk to your healthcare provider if you have any concerns.      Limit sunlight exposure. Sunlight can cause a condition caused methotrexate dermatitis (skin irritation).    For support and more information:     The [employer]  [address] [address]  [place] [address]  Phone: 1-633.531.8489  Phone: 1-511.298.2512  Web Address: http://www.acog.org      Follow up with your gynecologist as directed: You may need to return for a follow-up exam, treatment, or blood tests. If you received methotrexate to stop your pregnancy, it is important to come in for follow-up tests. Write down your questions so you remember to ask them during your visits.

## 2019-10-01 NOTE — ED PROVIDER NOTE - PHYSICAL EXAMINATION
Gen: NAD  Head: NCAT  HEENT: PERRL, oral mucosa moist, normal conjunctiva, oropharynx clear without exudate or erythema  Lung: CTAB, no respiratory distress, no wheezing, rales, rhonchi  CV: normal s1/s2, rrr, Normal perfusion, pulses 2+ throughout  Abd: soft, NTND, no CVA tenderness  Genitourinary: pelvic tenderness  MSK: No edema, no visible deformities, full range of motion in all 4 extremities  Neuro: AOx3  Skin: No rash   Psych: normal affect

## 2019-10-01 NOTE — ED ADULT NURSE NOTE - NSIMPLEMENTINTERV_GEN_ALL_ED
Implemented All Universal Safety Interventions:  [place] to call system. Call bell, personal items and telephone within reach. Instruct patient to call for assistance. Room bathroom lighting operational. Non-slip footwear when patient is off stretcher. Physically safe environment: no spills, clutter or unnecessary equipment. Stretcher in lowest position, wheels locked, appropriate side rails in place.

## 2019-10-01 NOTE — ED PROVIDER NOTE - NS ED ROS FT
Constitutional: (-) fever  Eyes/ENT: (-) blurry vision, (-) epistaxis, (-) visual changes   Cardiovascular: (-) chest pain, (-) syncope  Respiratory: (-) cough, (-) shortness of breath  Gastrointestinal: (-) vomiting, (-) diarrhea  Genitourinary: (-) dysuria, (+) hesitancy, (-) frequency   Musculoskeletal: (-) neck pain, (-) back pain, (-) joint pain  Integumentary: (-) rash, (-) edema  Neurological: (-) headache, (-) altered mental status  Allergic/Immunologic: (-) pruritus

## 2019-10-01 NOTE — ED PROVIDER NOTE - OBJECTIVE STATEMENT
33 yo female with no known pmh presents with pelvic pain. pt describes this pain as a pressure sensation with urinary hesitancy. she states to currently be menstruating for about one week with the initial bleeding being heavier and darker. in the beginning of september she states to have taken the day after pill. she denies any other symptoms including fevers, chill, headache, recent illness/travel, cough, abdominal pain, chest pain, or SOB

## 2019-10-01 NOTE — ED PROVIDER NOTE - CLINICAL SUMMARY MEDICAL DECISION MAKING FREE TEXT BOX
Pt presenting with pelvic pain, vaginal bleeding. labs imaging reviewed. results concerning for ectopic v torsion. sp ob gyn eval, added onto OR schedule for today. Discussed and given results of all testing thus far with patient. Pt is refusing further eval and treatment in hospital. We had an extensive discussion of the risks and benefits of pursuing further medical evaluation. Patient is choosing to leave against medical advice. Patient is awake, alert, oriented, and demonstrates full capacity and insight into their illness and situation. Patient aware that they may return at any time for further care or for new and/or concerning symptoms. Patient verbalizes understanding of all of the above and has no further questions or concerns at this time. She is going to follow-up with Dr. Hudson in the office TODAY at 11pm. Ob/gyn aware.

## 2019-10-01 NOTE — ED PROVIDER NOTE - PROGRESS NOTE DETAILS
pt does not feel comfortable here and rather see her OB Dr. Hudson, who she sts she spokje with and will see today at 11am. Pt informed if she does in fact have and ectopic pregnancy and it ruptures she can die, also made aware that possible torsion according to US can cause infertility.

## 2019-10-01 NOTE — ED PROVIDER NOTE - PATIENT PORTAL LINK FT
You can access the FollowMyHealth Patient Portal offered by [facility] by registering at the following website: [email or web address]. By joining [facility]’s FollowMyHealth portal, you will also be able to view your health information using other applications (apps) compatible with our system.

## 2019-10-02 VITALS
RESPIRATION RATE: 18 BRPM | OXYGEN SATURATION: 100 % | DIASTOLIC BLOOD PRESSURE: 60 MMHG | SYSTOLIC BLOOD PRESSURE: 108 MMHG | TEMPERATURE: 99 F | HEART RATE: 66 BPM

## 2019-10-02 LAB
ALBUMIN SERPL ELPH-MCNC: 4.3 G/DL — SIGNIFICANT CHANGE UP (ref 3.5–5.2)
ALP SERPL-CCNC: 55 U/L — SIGNIFICANT CHANGE UP (ref 30–115)
ALT FLD-CCNC: 8 U/L — SIGNIFICANT CHANGE UP (ref 0–41)
ANION GAP SERPL CALC-SCNC: 14 MMOL/L — SIGNIFICANT CHANGE UP (ref 7–14)
AST SERPL-CCNC: 11 U/L — SIGNIFICANT CHANGE UP (ref 0–41)
BASOPHILS # BLD AUTO: 0.02 K/UL — SIGNIFICANT CHANGE UP (ref 0–0.2)
BASOPHILS NFR BLD AUTO: 0.3 % — SIGNIFICANT CHANGE UP (ref 0–1)
BILIRUB SERPL-MCNC: 0.5 MG/DL — SIGNIFICANT CHANGE UP (ref 0.2–1.2)
BUN SERPL-MCNC: 9 MG/DL — LOW (ref 10–20)
CALCIUM SERPL-MCNC: 9.1 MG/DL — SIGNIFICANT CHANGE UP (ref 8.5–10.1)
CHLORIDE SERPL-SCNC: 101 MMOL/L — SIGNIFICANT CHANGE UP (ref 98–110)
CO2 SERPL-SCNC: 22 MMOL/L — SIGNIFICANT CHANGE UP (ref 17–32)
CREAT SERPL-MCNC: 0.6 MG/DL — LOW (ref 0.7–1.5)
EOSINOPHIL # BLD AUTO: 0.16 K/UL — SIGNIFICANT CHANGE UP (ref 0–0.7)
EOSINOPHIL NFR BLD AUTO: 2.3 % — SIGNIFICANT CHANGE UP (ref 0–8)
GLUCOSE SERPL-MCNC: 111 MG/DL — HIGH (ref 70–99)
HCG SERPL-ACNC: 788.6 MIU/ML — HIGH
HCT VFR BLD CALC: 32.4 % — LOW (ref 37–47)
HGB BLD-MCNC: 11.1 G/DL — LOW (ref 12–16)
IMM GRANULOCYTES NFR BLD AUTO: 0.4 % — HIGH (ref 0.1–0.3)
LYMPHOCYTES # BLD AUTO: 3.16 K/UL — SIGNIFICANT CHANGE UP (ref 1.2–3.4)
LYMPHOCYTES # BLD AUTO: 45.7 % — SIGNIFICANT CHANGE UP (ref 20.5–51.1)
MCHC RBC-ENTMCNC: 31.3 PG — HIGH (ref 27–31)
MCHC RBC-ENTMCNC: 34.3 G/DL — SIGNIFICANT CHANGE UP (ref 32–37)
MCV RBC AUTO: 91.3 FL — SIGNIFICANT CHANGE UP (ref 81–99)
MONOCYTES # BLD AUTO: 0.51 K/UL — SIGNIFICANT CHANGE UP (ref 0.1–0.6)
MONOCYTES NFR BLD AUTO: 7.4 % — SIGNIFICANT CHANGE UP (ref 1.7–9.3)
NEUTROPHILS # BLD AUTO: 3.04 K/UL — SIGNIFICANT CHANGE UP (ref 1.4–6.5)
NEUTROPHILS NFR BLD AUTO: 43.9 % — SIGNIFICANT CHANGE UP (ref 42.2–75.2)
NRBC # BLD: 0 /100 WBCS — SIGNIFICANT CHANGE UP (ref 0–0)
PLATELET # BLD AUTO: 266 K/UL — SIGNIFICANT CHANGE UP (ref 130–400)
POTASSIUM SERPL-MCNC: 3.5 MMOL/L — SIGNIFICANT CHANGE UP (ref 3.5–5)
POTASSIUM SERPL-SCNC: 3.5 MMOL/L — SIGNIFICANT CHANGE UP (ref 3.5–5)
PROT SERPL-MCNC: 7 G/DL — SIGNIFICANT CHANGE UP (ref 6–8)
RBC # BLD: 3.55 M/UL — LOW (ref 4.2–5.4)
RBC # FLD: 11.3 % — LOW (ref 11.5–14.5)
SODIUM SERPL-SCNC: 137 MMOL/L — SIGNIFICANT CHANGE UP (ref 135–146)
WBC # BLD: 6.92 K/UL — SIGNIFICANT CHANGE UP (ref 4.8–10.8)
WBC # FLD AUTO: 6.92 K/UL — SIGNIFICANT CHANGE UP (ref 4.8–10.8)

## 2019-10-02 PROCEDURE — 76817 TRANSVAGINAL US OBSTETRIC: CPT | Mod: 26

## 2019-10-02 RX ORDER — MORPHINE SULFATE 50 MG/1
4 CAPSULE, EXTENDED RELEASE ORAL ONCE
Refills: 0 | Status: DISCONTINUED | OUTPATIENT
Start: 2019-10-02 | End: 2019-10-02

## 2019-10-02 NOTE — CONSULT NOTE ADULT - ASSESSMENT
35yo  at 5w6d, with abnormal intrauterine gestational sac on sonogram, rule out ectopic pregnancy vs ruptured ovarian cyst.     Dr. Conner aware, Dr. Jerome aware 33yo  at 5w6d, with abnormal intrauterine gestational sac on sonogram, rule out ruptured ovarian cyst vs fallopian tube torsion, with abnormal pregnancy.   -GYN to reevaluate   -RH positive  -Repeat vital signs    Dr. Conner aware, Dr. Jerome aware 33yo  at 5w6d, with abnormal intrauterine gestational sac on sonogram, rule out ruptured ovarian cyst vs fallopian tube torsion, with abnormal pregnancy.   -GYN to reevaluate   -Observe for additional abdominal exam   -RH positive  -Repeat vital signs    Dr. Conner aware, Dr. Jerome aware 35yo  at 5w6d, with vaginal bleeding and RLQ pain, no definite IUP and right adnexal mass suspicious for ectopic pregnancy, possible early pregnancy with adnexal mass can't r/o torsion. Unplanned, undesired pregnancy. Rh +.     We discussed with patient findings of labs and imaging. We discussed that her bhcg level is below discriminatory level and she could have an early pregnancy that is still not able to be detected on sonogram. We discussed that she has a mass in the right adnexa that could represent an ectopic pregnancy, and due to her findings on physical exam (RLQ tenderness with rebound, R adnexal tenderness and dark scant bleeding from vagina) we are suspicious that it is an ectopic pregnancy.    We discussed that at this point with a one time hormone level and current imaging we are unable to differentiate between early pregnancy vs ectopic but due to high suspicion for ectopic we recommend surgical management. We discussed in detail "diagnostic laparoscopy possible right or left salpingostomy/salpingectomy and suction dilation and curettage".   Risks (including but not limited to infection/bleeding/DVT/PE/injury to surrounding structures such as bladder/bowel/nerves/vessels), benefits and alternatives explained.    We discussed alternative conservative management with inhouse observation and repeat bhcg and sono in 48hours or outpatient repeat bhcg and sono in 48h. I counseled her that if option to leave as outpatient she has the risks of a rupture of ectopic pregnancy, that can lead to intraabdominal hemorrhage requiring emergency surgery, blood transfusion and possible death.     Patient voiced understanding. She declined surgical management and would like to see Dr Euceda for a second opinion. She signed out against medical advice.     We reinforced bleeding and ectopic precautions and strongly urged the patient to see her doctor as soon as possible. She has an appointment at 11am. Dr Euceda's office contacted to inform about current findings and confirm that patient will be seen.      Dr. Conner aware, Dr. Jerome aware

## 2019-10-02 NOTE — CHART NOTE - NSCHARTNOTEFT_GEN_A_CORE
Attempted to call patient to ensure follow up care with PMD and to encourage return to ED for further treatment, voicemail box not set up.

## 2019-10-02 NOTE — CONSULT NOTE ADULT - SUBJECTIVE AND OBJECTIVE BOX
Chief Complaint:    HPI: 33yo  at 5w6d, dated by LMP of 19, with 3 days of dark brown bleeding and 1 day of abdominal pain. She reports staining 2 pads per day, darker than her usual bleeding. She reports abdominal pain, dull, cramping, 6/10 in intensity, that started last night, about 10 hours ago. She reports mild pain relief with ibuprofen at home. She denies CP, SOB, RUQ pain/ Epigastric pain, N/V, LE pain/ swelling, diarrhea, pain/difficulty with urination, fevers, chills.     Ob/Gyn History:           LMP- 19; regular periods. Previously on OCPs>5 years ago.   ETOPX2, D+CX1.   NSVDX2. P1: FT . P2: Twin gestation, FT, GDMA1.   Denies history of ovarian cysts, uterine fibroids, abnormal papsmears. Reports history of Chlamydia.   Last Pap Smear - 2019 at the [facility].     Denies the following: constitutional symptoms, visual symptoms, cardiovascular symptoms, respiratory symptoms, GI symptoms, musculoskeletal symptoms, skin symptoms, neurologic symptoms, hematologic symptoms, allergic symptoms, psychiatric symptoms  Except any pertinent positives listed.     Home Medications: None    Allergies  penicillin - unknown   shellfish (Short breath)      PAST MEDICAL & SURGICAL HISTORY:  HLD (hyperlipidemia)  D+C    FAMILY HISTORY:    SOCIAL HISTORY: Denies cigarette use, alcohol use, or illicit drug use    Vital Signs Last 24 Hrs  T(F): 98.9 (01 Oct 2019 22:23), Max: 98.9 (01 Oct 2019 22:23)  HR: 73 (01 Oct 2019 22:23) (73 - 73)  BP: 122/60 (01 Oct 2019 22:23) (122/60 - 122/60)  RR: 18 (01 Oct 2019 22:23) (18 - 18)    General Appearance - AAOx3, NAD  Heart - S1S2 regular rate and rhythm  Lung - CTA Bilaterally  Abdomen - Soft, nontender, nondistended, no rebound, no rigidity, no guarding, bowel sounds present    GYN/Pelvis:  Labia Majora - Normal  Labia Minora - Normal  Clitoris - Normal  Urethra - Normal  Vagina - Normal  Cervix - Normal; no CMT; no active bleeding, closed     Uterus:  Size - Normal  Tenderness - None  Mass - None  Freely mobile    Adnexa:  Masses - None appreciated   Tenderness - right adnexal tenderness       LABS:                        11.1   6.92  )-----------( 266      ( 01 Oct 2019 23:50 )             32.4     HCG Quantitative, Serum: 788.6 mIU/mL (10-01-19 @ 23:50)  ABO RH Interpretation: B POS (10-01-19 @ 23:50)  Antibody Screen: NEG (10-01-19 @ 23:50)    10-01    137  |  101  |  9<L>  ----------------------------<  111<H>  3.5   |  22  |  0.6<L>    Ca    9.1      01 Oct 2019 23:50    TPro  7.0  /  Alb  4.3  /  TBili  0.5  /  DBili  x   /  AST  11  /  ALT  8   /  AlkPhos  55  10      Urinalysis Basic - ( 01 Oct 2019 23:00 )  Color: Yellow / Appearance: Clear / S.028 / pH: x  Gluc: x / Ketone: Small  / Bili: Negative / Urobili: 3 mg/dL   Blood: x / Protein: 30 mg/dL / Nitrite: Negative   Leuk Esterase: Negative / RBC: 18 /HPF / WBC 5 /HPF   Sq Epi: x / Non Sq Epi: 4 /HPF / Bacteria: Few          RADIOLOGY & ADDITIONAL STUDIES: Chief Complaint:    HPI: 35yo  at 5w6d, dated by LMP of 19, with 3 days of dark brown bleeding and 1 day of abdominal pain. She reports staining 2 pads per day, darker than her usual bleeding. She reports abdominal pain, dull, cramping, 6/10 in intensity, that started last night, about 10 hours ago. She reports mild pain relief with ibuprofen at home. She denies CP, SOB, RUQ pain/ Epigastric pain, N/V, LE pain/ swelling, diarrhea, pain/difficulty with urination, fevers, chills.     Ob/Gyn History:           LMP- 19; regular periods. Previously on OCPs>5 years ago.   ETOPX2, D+CX1.   NSVDX2. P1: FT . P2: Twin gestation, FT, GDMA1.   Denies history of ovarian cysts, uterine fibroids, abnormal papsmears. Reports history of Chlamydia.   Last Pap Smear - 2019 at the [facility].     Denies the following: constitutional symptoms, visual symptoms, cardiovascular symptoms, respiratory symptoms, GI symptoms, musculoskeletal symptoms, skin symptoms, neurologic symptoms, hematologic symptoms, allergic symptoms, psychiatric symptoms  Except any pertinent positives listed.     Home Medications: None    Allergies  penicillin - unknown   shellfish (Short breath)      PAST MEDICAL & SURGICAL HISTORY:  HLD (hyperlipidemia)  D+C    FAMILY HISTORY:    SOCIAL HISTORY: Denies cigarette use, alcohol use, or illicit drug use    Vital Signs Last 24 Hrs  T(F): 98.9 (01 Oct 2019 22:23), Max: 98.9 (01 Oct 2019 22:23)  HR: 73 (01 Oct 2019 22:23) (73 - 73)  BP: 122/60 (01 Oct 2019 22:23) (122/60 - 122/60)  RR: 18 (01 Oct 2019 22:23) (18 - 18)    General Appearance - AAOx3, NAD  Heart - S1S2 regular rate and rhythm  Lung - CTA Bilaterally  Abdomen - Soft, right lower quadrant tenderness, nondistended, no rebound, no rigidity, no guarding, bowel sounds present.     GYN/Pelvis:  Labia Majora - Normal  Labia Minora - Normal  Clitoris - Normal  Urethra - Normal  Vagina - Normal  Cervix - Normal; no CMT; no active bleeding, closed     Uterus:  Size - Normal  Tenderness - None  Mass - None  Freely mobile    Adnexa:  Masses - None appreciated   Tenderness - right adnexal tenderness       LABS:                        11.1   6.92  )-----------( 266      ( 01 Oct 2019 23:50 )             32.4     HCG Quantitative, Serum: 788.6 mIU/mL (10-01-19 @ 23:50)  ABO RH Interpretation: B POS (10-01-19 @ 23:50)  Antibody Screen: NEG (10-01-19 @ 23:50)    10-01    137  |  101  |  9<L>  ----------------------------<  111<H>  3.5   |  22  |  0.6<L>    Ca    9.1      01 Oct 2019 23:50    TPro  7.0  /  Alb  4.3  /  TBili  0.5  /  DBili  x   /  AST  11  /  ALT  8   /  AlkPhos  55  10      Urinalysis Basic - ( 01 Oct 2019 23:00 )  Color: Yellow / Appearance: Clear / S.028 / pH: x  Gluc: x / Ketone: Small  / Bili: Negative / Urobili: 3 mg/dL   Blood: x / Protein: 30 mg/dL / Nitrite: Negative   Leuk Esterase: Negative / RBC: 18 /HPF / WBC 5 /HPF   Sq Epi: x / Non Sq Epi: 4 /HPF / Bacteria: Few          RADIOLOGY & ADDITIONAL STUDIES: Chief Complaint:    HPI: 33yo  at 5w6d, dated by LMP of 19, with 3 days of dark brown bleeding and 1 day of abdominal pain. She reports staining 2 pads per day, darker than her usual bleeding. She reports abdominal pain, dull, cramping, 6/10 in intensity, that started last night, about 10 hours ago. She reports mild pain relief with ibuprofen at home. She denies CP, SOB, RUQ pain/ Epigastric pain, N/V, LE pain/ swelling, diarrhea, pain/difficulty with urination, fevers, chills. Took plan B, 1 dose on  s/p unprotected intercourse.     Ob/Gyn History:           LMP- 19; regular periods. Previously on OCPs>5 years ago.   ETOPX2, D+CX1.   NSVDX2. P1: FT . P2: Twin gestation, FT, GDMA1.   Denies history of ovarian cysts, uterine fibroids, abnormal papsmears. Reports history of Chlamydia, treated.   Last Pap Smear - 2019 at the [facility].     Denies the following: constitutional symptoms, visual symptoms, cardiovascular symptoms, respiratory symptoms, GI symptoms, musculoskeletal symptoms, skin symptoms, neurologic symptoms, hematologic symptoms, allergic symptoms, psychiatric symptoms  Except any pertinent positives listed.     Home Medications: None    Allergies  penicillin - unknown   shellfish (Short breath)    PAST MEDICAL & SURGICAL HISTORY:  HLD (hyperlipidemia)  D+C    FAMILY HISTORY: denies    SOCIAL HISTORY: Denies cigarette use, alcohol use, or illicit drug use    Vital Signs Last 24 Hrs  T(F): 98.9 (01 Oct 2019 22:23), Max: 98.9 (01 Oct 2019 22:23)  HR: 73 (01 Oct 2019 22:23) (73 - 73)  BP: 122/60 (01 Oct 2019 22:23) (122/60 - 122/60)  RR: 18 (01 Oct 2019 22:23) (18 - 18)    General Appearance - AAOx3, NAD  Heart - S1S2 regular rate and rhythm  Lung - CTA Bilaterally  Abdomen - Soft, right lower quadrant tenderness, nondistended, no rebound, no rigidity, no guarding, bowel sounds present.     GYN/Pelvis:  Labia Majora - Normal  Labia Minora - Normal  Clitoris - Normal  Urethra - Normal  Vagina - Normal; scant blood   Cervix - Normal; no CMT; no active bleeding, closed     Uterus:  Size - Normal  Tenderness - None  Mass - None  Freely mobile    Adnexa:  Masses - None appreciated   Tenderness - right adnexal tenderness       LABS:                        11.1   6.92  )-----------( 266      ( 01 Oct 2019 23:50 )             32.4     HCG Quantitative, Serum: 788.6 mIU/mL (10-01-19 @ 23:50)  ABO RH Interpretation: B POS (10-01-19 @ 23:50)  Antibody Screen: NEG (10-01-19 @ 23:50)    10-01    137  |  101  |  9<L>  ----------------------------<  111<H>  3.5   |  22  |  0.6<L>    Ca    9.1      01 Oct 2019 23:50    TPro  7.0  /  Alb  4.3  /  TBili  0.5  /  DBili  x   /  AST  11  /  ALT  8   /  AlkPhos  55  10-01      Urinalysis Basic - ( 01 Oct 2019 23:00 )  Color: Yellow / Appearance: Clear / S.028 / pH: x  Gluc: x / Ketone: Small  / Bili: Negative / Urobili: 3 mg/dL   Blood: x / Protein: 30 mg/dL / Nitrite: Negative   Leuk Esterase: Negative / RBC: 18 /HPF / WBC 5 /HPF   Sq Epi: x / Non Sq Epi: 4 /HPF / Bacteria: Few          RADIOLOGY & ADDITIONAL STUDIES:  < from: US Transvaginal, OB (10.02.19 @ 03:43) >  FINDINGS:   The uterus measures 10.2 x 6.9 x 4.9 cm containing a sac-like structure   which measures 1.5 x 0.4 x 1.1 cm. No definite  fetal pole is identified.  A heterogeneous right adnexal structure measuring up to 4.5 x 2.2 x 2.6   cm is identified with hyperemia on Doppler imaging. The structure is not   clearly delineated from the right ovary.  The right ovary measures 3.0 x 1.7 x 1.2 cm. The left ovary measures 4.0   x 3.0x 2.0 cm. a 1.9 x 1.8 x 1.7 cm left ovarian corpus luteal cyst is   present.  Doppler flow is demonstrated to both ovaries.  IMPRESSION:  Heterogeneous right adnexal structure measuring up to 4.5 cm with   regional hyperemia. This structure is not clearly delineated from the   right ovary and may represent a corpus luteal cyst, however, ectopic   pregnancy cannot be excluded. Recommend short-term follow-up imaging and   serial beta hCG. 1.5 cm sac-like structure within the uterus which may represent early   gestational sac. No fetal pole is identified.  Left ovarian corpus luteal cyst measuring up to 1.9 cm.    Dr. Jr Peters discussed preliminary findings with DIONICIO SANDHU on   10/2/2019 4:09 AM with readback.  Additional Findings/Recommendations After Attending Radiologist Review:    On the provided cine clips, the above described complex right adnexal   structure appears to have a swirling pattern which raises the possibility   of an isolated fallopian tube torsion.    This was discussed with Dr. Sandhu and Dr Vasquez (GYN) on 2019 at 6:30 AM  < end of copied text > Chief Complaint:    HPI: 35yo  at 5w6d, dated by LMP of 19, with 3 days of dark brown bleeding and 1 day of abdominal pain. She reports staining 2 pads per day, darker than her usual bleeding. She reports abdominal pain, dull, cramping, 6/10 in intensity, that started last night, about 10 hours ago. She reports mild pain relief with ibuprofen at home. She denies CP, SOB, RUQ pain/ Epigastric pain, N/V, LE pain/ swelling, diarrhea, pain/difficulty with urination, fevers, chills. Took plan B, 1 dose on  s/p unprotected intercourse.     Ob/Gyn History:           LMP- 19; regular periods. Previously on OCPs>5 years ago.   ETOPX2, D+CX1.   NSVDX2. P1: FT . P2: Twin gestation, FT, GDMA1.   Denies history of ovarian cysts, uterine fibroids, abnormal papsmears. Reports history of Chlamydia, treated.   Last Pap Smear - 2019 at the [facility].     Denies the following: constitutional symptoms, visual symptoms, cardiovascular symptoms, respiratory symptoms, GI symptoms, musculoskeletal symptoms, skin symptoms, neurologic symptoms, hematologic symptoms, allergic symptoms, psychiatric symptoms  Except any pertinent positives listed.     Home Medications: None    Allergies  penicillin - unknown   shellfish (Short breath)    PAST MEDICAL & SURGICAL HISTORY:  HLD (hyperlipidemia)  D+C    FAMILY HISTORY: denies    SOCIAL HISTORY: Denies cigarette use, alcohol use, or illicit drug use    Vital Signs Last 24 Hrs  T(F): 98.9 (01 Oct 2019 22:23), Max: 98.9 (01 Oct 2019 22:23)  HR: 73 (01 Oct 2019 22:23) (73 - 73)  BP: 122/60 (01 Oct 2019 22:23) (122/60 - 122/60)  RR: 18 (01 Oct 2019 22:23) (18 - 18)    General Appearance - AAOx3, NAD  Heart - S1S2 regular rate and rhythm  Lung - CTA Bilaterally  Abdomen - Soft, right lower quadrant tenderness, nondistended, + rebound, no rigidity, no guarding, bowel sounds present.     GYN/Pelvis:  Labia Majora - Normal  Labia Minora - Normal  Clitoris - Normal  Urethra - Normal  Vagina - Normal; scant dark blood   Cervix - Normal; no CMT; no active bleeding, closed     Uterus:  Size - Normal  Tenderness - no tenderness  Mass - None  Freely mobile    Adnexa:  Masses - None appreciated   Tenderness - right adnexal tenderness       LABS:                        11.1   6.92  )-----------( 266      ( 01 Oct 2019 23:50 )             32.4     HCG Quantitative, Serum: 788.6 mIU/mL (10-01-19 @ 23:50)  ABO RH Interpretation: B POS (10-01-19 @ 23:50)  Antibody Screen: NEG (10-01-19 @ 23:50)    10-01    137  |  101  |  9<L>  ----------------------------<  111<H>  3.5   |  22  |  0.6<L>    Ca    9.1      01 Oct 2019 23:50    TPro  7.0  /  Alb  4.3  /  TBili  0.5  /  DBili  x   /  AST  11  /  ALT  8   /  AlkPhos  55  10-01      Urinalysis Basic - ( 01 Oct 2019 23:00 )  Color: Yellow / Appearance: Clear / S.028 / pH: x  Gluc: x / Ketone: Small  / Bili: Negative / Urobili: 3 mg/dL   Blood: x / Protein: 30 mg/dL / Nitrite: Negative   Leuk Esterase: Negative / RBC: 18 /HPF / WBC 5 /HPF   Sq Epi: x / Non Sq Epi: 4 /HPF / Bacteria: Few          RADIOLOGY & ADDITIONAL STUDIES:  < from: US Transvaginal, OB (10.02.19 @ 03:43) >  FINDINGS:   The uterus measures 10.2 x 6.9 x 4.9 cm containing a sac-like structure   which measures 1.5 x 0.4 x 1.1 cm. No definite  fetal pole is identified.  A heterogeneous right adnexal structure measuring up to 4.5 x 2.2 x 2.6   cm is identified with hyperemia on Doppler imaging. The structure is not   clearly delineated from the right ovary.  The right ovary measures 3.0 x 1.7 x 1.2 cm. The left ovary measures 4.0   x 3.0x 2.0 cm. a 1.9 x 1.8 x 1.7 cm left ovarian corpus luteal cyst is   present.  Doppler flow is demonstrated to both ovaries.  IMPRESSION:  Heterogeneous right adnexal structure measuring up to 4.5 cm with   regional hyperemia. This structure is not clearly delineated from the   right ovary and may represent a corpus luteal cyst, however, ectopic   pregnancy cannot be excluded. Recommend short-term follow-up imaging and   serial beta hCG. 1.5 cm sac-like structure within the uterus which may represent early   gestational sac. No fetal pole is identified.  Left ovarian corpus luteal cyst measuring up to 1.9 cm.    Dr. Jr Peters discussed preliminary findings with DIONICIO SANDHU on   10/2/2019 4:09 AM with readback.  Additional Findings/Recommendations After Attending Radiologist Review:    On the provided cine clips, the above described complex right adnexal   structure appears to have a swirling pattern which raises the possibility   of an isolated fallopian tube torsion.    This was discussed with Dr. Sandhu and Dr Vasquez (GYN) on 2019 at 6:30 AM  < end of copied text >

## 2019-10-02 NOTE — CONSULT NOTE ADULT - ATTENDING COMMENTS
I have personally seen and examined the patient. The complete history/physical, assessment and plan are stated in the resident's note that was reviewed and edited by me where necessary.

## 2019-10-03 LAB
CULTURE RESULTS: SIGNIFICANT CHANGE UP
SPECIMEN SOURCE: SIGNIFICANT CHANGE UP

## 2019-10-03 NOTE — CHART NOTE - NSCHARTNOTEFT_GEN_A_CORE
Mrs. Mae presented to the ED 10/1    HPI: 33yo  at 5w6d by LMP 19 presented with bleeding and abdominal pain. Reported unprotected sexual intercourse on , followed by plan B.     Labs:  10/1: bhcg 788, B Pos,  9.9><266, 137/3.5/96858/9/0.6<111, AST/ ALT     Imaging:  10/1 TVUS: The uterus measures 10.2 x 6.9 x 4.9 cm containing a sac-like structure which measures 1.5 x 0.4 x 1.1 cm. No definite  fetal pole is identified. A heterogeneous right adnexal structure measuring up to 4.5 x 2.2 x 2.6   cm is identified with hyperemia on Doppler imaging. The structure is not clearly delineated from the right ovary.  The right ovary measures 3.0 x 1.7 x 1.2 cm. The left ovary measures 4.0 x 3.0 x 2.0 cm. a 1.9 x 1.8 x 1.7 cm left ovarian corpus luteal cyst is present.Doppler flow is demonstrated to both ovaries. IMPRESSION: Heterogeneous right adnexal structure measuring up to 4.5 cm with regional hyperemia. This structure is not clearly delineated from the right ovary and may represent a corpus luteal cyst, however, ectopic pregnancy cannot be excluded. Recommend short-term follow-up imaging and serial beta hCG.1.5 cm sac-like structure within the uterus which may represent early gestational sac. No fetal pole is identified.Left ovarian corpus luteal cyst measuring up to 1.9 cm. Additional Findings/Recommendations After Attending Radiologist Review:    On the provided cine clips, the above described complex right adnexal structure appears to have a swirling pattern which raises the possibility of an isolated fallopian tube torsion.    Ddx at this time was Ectopic pregnancy vs abnormal pregnacny     Extensive discussion had with patient at time of presentation explaining suspicion for ectopic pregnancy, recommendation for surgical management. Discussed r/b/a of surgery. Pt expressed understanding, desired to follow up with her PMD, signed out AMA. Spoke with PMD Dr. Euceda today. Per him, pt was seen in office yesterday and confirmed he is assuming care.     Dr. Sims and Dr. Jerome aware.

## 2019-10-04 DIAGNOSIS — Z71.89 OTHER SPECIFIED COUNSELING: ICD-10-CM

## 2019-10-13 NOTE — ED ADULT NURSE NOTE - DOES PATIENT HAVE ADVANCE DIRECTIVE
[FreeTextEntry1] : Mr. Osman Ness is a 69 year-old male with T1cN3 extensive stage small cell lung cancer. He was started on first line systemic therapy with Carbo/Etoposide/Atezolizumab in May 2019 and achieved partial response with disease limited to the thorax after Cycle 2. He completed 4 cycles in early July and has been on Atezolizumab maintenance since then. He received consolidative radiation therapy to the lung/mediastinum totaling 30 Gy/10fx from 8/9/19- 8/22/19. \par \par On 9/23/19, he underwent a CT chest which showed 1. The spiculated left upper lobe nodule is unchanged. 2. The AP window lymph node is unchanged. \par \par Last follow-up with Dr. Ramirez was 9/25/19 with plan to continue Atezolizumab maintenance for as long as it benefits the patient with plan on obtaining his next surveillance CT scan in December if he remains clinically stable. Mr. Ness declined PCI and planned for surveillance Brain MRI in November\par 
No

## 2019-10-24 ENCOUNTER — OUTPATIENT (OUTPATIENT)
Dept: OUTPATIENT SERVICES | Facility: HOSPITAL | Age: 34
LOS: 1 days | Discharge: HOME | End: 2019-10-24

## 2019-10-24 DIAGNOSIS — B20 HUMAN IMMUNODEFICIENCY VIRUS [HIV] DISEASE: ICD-10-CM

## 2020-02-15 ENCOUNTER — EMERGENCY (EMERGENCY)
Facility: HOSPITAL | Age: 35
LOS: 0 days | Discharge: HOME | End: 2020-02-16
Admitting: EMERGENCY MEDICINE
Payer: MEDICAID

## 2020-02-15 VITALS
TEMPERATURE: 99 F | DIASTOLIC BLOOD PRESSURE: 76 MMHG | OXYGEN SATURATION: 99 % | RESPIRATION RATE: 20 BRPM | HEART RATE: 92 BPM | HEIGHT: 64 IN | WEIGHT: 156.97 LBS | SYSTOLIC BLOOD PRESSURE: 114 MMHG

## 2020-02-15 PROCEDURE — 99283 EMERGENCY DEPT VISIT LOW MDM: CPT

## 2020-02-15 RX ORDER — DEXAMETHASONE 0.5 MG/5ML
10 ELIXIR ORAL ONCE
Refills: 0 | Status: COMPLETED | OUTPATIENT
Start: 2020-02-15 | End: 2020-02-15

## 2020-02-15 RX ORDER — AZITHROMYCIN 500 MG/1
1 TABLET, FILM COATED ORAL
Qty: 5 | Refills: 0
Start: 2020-02-15 | End: 2020-02-19

## 2020-02-15 RX ORDER — IBUPROFEN 200 MG
600 TABLET ORAL ONCE
Refills: 0 | Status: COMPLETED | OUTPATIENT
Start: 2020-02-15 | End: 2020-02-15

## 2020-02-15 RX ADMIN — Medication 600 MILLIGRAM(S): at 22:10

## 2020-02-15 RX ADMIN — Medication 10 MILLIGRAM(S): at 22:11

## 2020-02-15 NOTE — ED PROVIDER NOTE - OBJECTIVE STATEMENT
33 yo female with no significant pmh presents to the ED c/o throat pain and headache x 2 days. Associated with nasal congestion. no cough. Denies fever, chills, change in voice, excessive drooling, chest pain, sob, abd pain, N/V/D, UE/LE weakness or paresthesias. nonsmoker. + recent sick contacts (her children are sick with [place] sxs currently.) Patient states she had the flu last week but was feeling better after, until 2 days ago. No recent travel.

## 2020-02-15 NOTE — ED PROVIDER NOTE - PHYSICAL EXAMINATION
GENERAL:  well appearing, non-toxic female in no acute distress  SKIN: skin warm, pink and dry. MMM. no rash.   ENT:  Airway intact. Patent oropharynx, + swelling, erythema and exudate to bilateral tonsils. Uvula midline, no abscess. TMs clear b/l.   NECK: Neck supple. No meningismus. Trachea midline  PULM: CTAB. Normal respiratory effort. No respiratory distress. No wheezes, stridor, rales or rhonchi. No retractions  CV: RRR, no M/R/G.   ABD: Soft, non-tender, non-distended  NEURO: A+Ox3, no sensory/motor deficits  PSYCH: appropriate behavior, cooperative.

## 2020-02-15 NOTE — ED PROVIDER NOTE - PATIENT PORTAL LINK FT
You can access the FollowMyHealth Patient Portal offered by [facility] by registering at the following website: [email or web address]. By joining [employer]s FollowMyHealth portal, you will also be able to view your health information using other applications (apps) compatible with our system.

## 2020-02-15 NOTE — ED PROVIDER NOTE - CLINICAL SUMMARY MEDICAL DECISION MAKING FREE TEXT BOX
PAtient here for throat pain x 2 days. + swelling and exudate to tonsils. Associated with headache and congestion. PAtient given nsaids, decadron and abx. Patient understands return precautions.

## 2020-02-16 NOTE — ED ADULT NURSE NOTE - NSIMPLEMENTINTERV_GEN_ALL_ED
Implemented All Universal Safety Interventions:  [place] to call system. Call bell, personal items and telephone within reach. Instruct patient to call for assistance. Room bathroom lighting operational. Non-slip footwear when patient is off stretcher. Physically safe environment: no spills, clutter or unnecessary equipment. Stretcher in lowest position, wheels locked, appropriate side rails in place.

## 2020-02-16 NOTE — ED ADULT NURSE NOTE - OBJECTIVE STATEMENT
pt presents to ed with c/o  throat pain and headache x 2 days. Associated with nasal congestion. no cough. Denies fever, chills, chest pain, sob, abd pain, N/V/D. Patient states she had the flu last week but was feeling better until 2 days ago.

## 2020-02-20 DIAGNOSIS — J06.9 ACUTE UPPER RESPIRATORY INFECTION, UNSPECIFIED: ICD-10-CM

## 2020-02-20 DIAGNOSIS — J02.9 ACUTE PHARYNGITIS, UNSPECIFIED: ICD-10-CM

## 2020-02-20 DIAGNOSIS — R51 HEADACHE: ICD-10-CM

## 2020-06-29 ENCOUNTER — RESULT REVIEW (OUTPATIENT)
Age: 35
End: 2020-06-29

## 2020-10-01 ENCOUNTER — OUTPATIENT (OUTPATIENT)
Dept: OUTPATIENT SERVICES | Facility: HOSPITAL | Age: 35
LOS: 1 days | End: 2020-10-01
Payer: MEDICAID

## 2020-10-14 ENCOUNTER — EMERGENCY (EMERGENCY)
Facility: HOSPITAL | Age: 35
LOS: 0 days | Discharge: LEFT BEFORE DISCHARGE | End: 2020-10-14
Attending: STUDENT IN AN ORGANIZED HEALTH CARE EDUCATION/TRAINING PROGRAM | Admitting: EMERGENCY MEDICINE
Payer: MEDICAID

## 2020-10-14 VITALS
HEART RATE: 107 BPM | SYSTOLIC BLOOD PRESSURE: 127 MMHG | DIASTOLIC BLOOD PRESSURE: 73 MMHG | WEIGHT: 139.99 LBS | TEMPERATURE: 100 F | RESPIRATION RATE: 18 BRPM | HEIGHT: 64 IN | OXYGEN SATURATION: 100 %

## 2020-10-14 VITALS
HEART RATE: 89 BPM | SYSTOLIC BLOOD PRESSURE: 104 MMHG | RESPIRATION RATE: 18 BRPM | DIASTOLIC BLOOD PRESSURE: 56 MMHG | OXYGEN SATURATION: 98 % | TEMPERATURE: 98 F

## 2020-10-14 DIAGNOSIS — N39.0 URINARY TRACT INFECTION, SITE NOT SPECIFIED: ICD-10-CM

## 2020-10-14 DIAGNOSIS — R30.0 DYSURIA: ICD-10-CM

## 2020-10-14 DIAGNOSIS — Z88.0 ALLERGY STATUS TO PENICILLIN: ICD-10-CM

## 2020-10-14 DIAGNOSIS — Z79.899 OTHER LONG TERM (CURRENT) DRUG THERAPY: ICD-10-CM

## 2020-10-14 DIAGNOSIS — Z21 ASYMPTOMATIC HUMAN IMMUNODEFICIENCY VIRUS [HIV] INFECTION STATUS: ICD-10-CM

## 2020-10-14 DIAGNOSIS — Z91.013 ALLERGY TO SEAFOOD: ICD-10-CM

## 2020-10-14 DIAGNOSIS — E78.5 HYPERLIPIDEMIA, UNSPECIFIED: ICD-10-CM

## 2020-10-14 LAB
ALBUMIN SERPL ELPH-MCNC: 4.3 G/DL — SIGNIFICANT CHANGE UP (ref 3.5–5.2)
ALP SERPL-CCNC: 63 U/L — SIGNIFICANT CHANGE UP (ref 30–115)
ALT FLD-CCNC: 21 U/L — SIGNIFICANT CHANGE UP (ref 0–41)
ANION GAP SERPL CALC-SCNC: 11 MMOL/L — SIGNIFICANT CHANGE UP (ref 7–14)
APPEARANCE UR: ABNORMAL
AST SERPL-CCNC: 18 U/L — SIGNIFICANT CHANGE UP (ref 0–41)
BACTERIA # UR AUTO: NEGATIVE — SIGNIFICANT CHANGE UP
BASOPHILS # BLD AUTO: 0.02 K/UL — SIGNIFICANT CHANGE UP (ref 0–0.2)
BASOPHILS NFR BLD AUTO: 0.2 % — SIGNIFICANT CHANGE UP (ref 0–1)
BILIRUB SERPL-MCNC: 0.5 MG/DL — SIGNIFICANT CHANGE UP (ref 0.2–1.2)
BILIRUB UR-MCNC: NEGATIVE — SIGNIFICANT CHANGE UP
BUN SERPL-MCNC: 8 MG/DL — LOW (ref 10–20)
C TRACH RRNA SPEC QL NAA+PROBE: SIGNIFICANT CHANGE UP
CALCIUM SERPL-MCNC: 9 MG/DL — SIGNIFICANT CHANGE UP (ref 8.5–10.1)
CHLORIDE SERPL-SCNC: 99 MMOL/L — SIGNIFICANT CHANGE UP (ref 98–110)
CO2 SERPL-SCNC: 26 MMOL/L — SIGNIFICANT CHANGE UP (ref 17–32)
COLOR SPEC: YELLOW — SIGNIFICANT CHANGE UP
CREAT SERPL-MCNC: 0.7 MG/DL — SIGNIFICANT CHANGE UP (ref 0.7–1.5)
DIFF PNL FLD: ABNORMAL
EOSINOPHIL # BLD AUTO: 0.04 K/UL — SIGNIFICANT CHANGE UP (ref 0–0.7)
EOSINOPHIL NFR BLD AUTO: 0.4 % — SIGNIFICANT CHANGE UP (ref 0–8)
EPI CELLS # UR: 4 /HPF — SIGNIFICANT CHANGE UP (ref 0–5)
GLUCOSE SERPL-MCNC: 103 MG/DL — HIGH (ref 70–99)
GLUCOSE UR QL: NEGATIVE — SIGNIFICANT CHANGE UP
HCG SERPL QL: NEGATIVE — SIGNIFICANT CHANGE UP
HCT VFR BLD CALC: 33.3 % — LOW (ref 37–47)
HGB BLD-MCNC: 10.9 G/DL — LOW (ref 12–16)
HYALINE CASTS # UR AUTO: 1 /LPF — SIGNIFICANT CHANGE UP (ref 0–7)
IMM GRANULOCYTES NFR BLD AUTO: 0.4 % — HIGH (ref 0.1–0.3)
KETONES UR-MCNC: NEGATIVE — SIGNIFICANT CHANGE UP
LACTATE SERPL-SCNC: 0.9 MMOL/L — SIGNIFICANT CHANGE UP (ref 0.7–2)
LEUKOCYTE ESTERASE UR-ACNC: ABNORMAL
LYMPHOCYTES # BLD AUTO: 1.98 K/UL — SIGNIFICANT CHANGE UP (ref 1.2–3.4)
LYMPHOCYTES # BLD AUTO: 20.2 % — LOW (ref 20.5–51.1)
MCHC RBC-ENTMCNC: 31.1 PG — HIGH (ref 27–31)
MCHC RBC-ENTMCNC: 32.7 G/DL — SIGNIFICANT CHANGE UP (ref 32–37)
MCV RBC AUTO: 94.9 FL — SIGNIFICANT CHANGE UP (ref 81–99)
MONOCYTES # BLD AUTO: 1.06 K/UL — HIGH (ref 0.1–0.6)
MONOCYTES NFR BLD AUTO: 10.8 % — HIGH (ref 1.7–9.3)
N GONORRHOEA RRNA SPEC QL NAA+PROBE: SIGNIFICANT CHANGE UP
NEUTROPHILS # BLD AUTO: 6.68 K/UL — HIGH (ref 1.4–6.5)
NEUTROPHILS NFR BLD AUTO: 68 % — SIGNIFICANT CHANGE UP (ref 42.2–75.2)
NITRITE UR-MCNC: NEGATIVE — SIGNIFICANT CHANGE UP
NRBC # BLD: 0 /100 WBCS — SIGNIFICANT CHANGE UP (ref 0–0)
PH UR: 7.5 — SIGNIFICANT CHANGE UP (ref 5–8)
PLATELET # BLD AUTO: 293 K/UL — SIGNIFICANT CHANGE UP (ref 130–400)
POTASSIUM SERPL-MCNC: 3.7 MMOL/L — SIGNIFICANT CHANGE UP (ref 3.5–5)
POTASSIUM SERPL-SCNC: 3.7 MMOL/L — SIGNIFICANT CHANGE UP (ref 3.5–5)
PROT SERPL-MCNC: 7.6 G/DL — SIGNIFICANT CHANGE UP (ref 6–8)
PROT UR-MCNC: SIGNIFICANT CHANGE UP
RBC # BLD: 3.51 M/UL — LOW (ref 4.2–5.4)
RBC # FLD: 11.3 % — LOW (ref 11.5–14.5)
RBC CASTS # UR COMP ASSIST: 275 /HPF — HIGH (ref 0–4)
SODIUM SERPL-SCNC: 136 MMOL/L — SIGNIFICANT CHANGE UP (ref 135–146)
SP GR SPEC: 1.02 — SIGNIFICANT CHANGE UP (ref 1.01–1.03)
SPECIMEN SOURCE: SIGNIFICANT CHANGE UP
UROBILINOGEN FLD QL: ABNORMAL
WBC # BLD: 9.82 K/UL — SIGNIFICANT CHANGE UP (ref 4.8–10.8)
WBC # FLD AUTO: 9.82 K/UL — SIGNIFICANT CHANGE UP (ref 4.8–10.8)
WBC UR QL: 21 /HPF — HIGH (ref 0–5)

## 2020-10-14 PROCEDURE — 99285 EMERGENCY DEPT VISIT HI MDM: CPT

## 2020-10-14 PROCEDURE — 74177 CT ABD & PELVIS W/CONTRAST: CPT | Mod: 26

## 2020-10-14 RX ORDER — MOXIFLOXACIN HYDROCHLORIDE TABLETS, 400 MG 400 MG/1
1 TABLET, FILM COATED ORAL
Qty: 14 | Refills: 0
Start: 2020-10-14 | End: 2020-10-20

## 2020-10-14 RX ORDER — PHENAZOPYRIDINE HCL 100 MG
1 TABLET ORAL
Qty: 9 | Refills: 0
Start: 2020-10-14 | End: 2020-10-16

## 2020-10-14 RX ORDER — SODIUM CHLORIDE 9 MG/ML
1000 INJECTION, SOLUTION INTRAVENOUS ONCE
Refills: 0 | Status: COMPLETED | OUTPATIENT
Start: 2020-10-14 | End: 2020-10-14

## 2020-10-14 RX ORDER — AZTREONAM 2 G
2000 VIAL (EA) INJECTION ONCE
Refills: 0 | Status: COMPLETED | OUTPATIENT
Start: 2020-10-14 | End: 2020-10-14

## 2020-10-14 RX ORDER — ACETAMINOPHEN 500 MG
975 TABLET ORAL ONCE
Refills: 0 | Status: COMPLETED | OUTPATIENT
Start: 2020-10-14 | End: 2020-10-14

## 2020-10-14 RX ADMIN — SODIUM CHLORIDE 1000 MILLILITER(S): 9 INJECTION, SOLUTION INTRAVENOUS at 02:56

## 2020-10-14 RX ADMIN — Medication 100 MILLIGRAM(S): at 07:30

## 2020-10-14 RX ADMIN — Medication 975 MILLIGRAM(S): at 02:57

## 2020-10-14 RX ADMIN — Medication 975 MILLIGRAM(S): at 02:56

## 2020-10-14 NOTE — ED PROVIDER NOTE - ATTENDING CONTRIBUTION TO CARE
Healthy 36 yo F, here for assessment of persistent dysuria, frequency with dribbling of urine, now fever in setting of 1 month of UTI sx, incomplete outpatient treatment. Patient has partially completed two outpatient courses of abx, initially amox and then another medication she cannot remember. She developed fever today, prompting ED eval.    No recent travel, sick contacts. +occasional nausea, no vomiting, intermittent bilateral flank pain, suprapubic pain, myalgias.    On exam is febrile with appropriate tachycardia, has clear lungs, RRR, suprapubic ttp, bilateral mild CVA tenderness.     Suspect UTI/pyelo with failed outpatient tx. Will give ceftriaxone, check labs, likely admit for continued IV abx.

## 2020-10-14 NOTE — ED PROVIDER NOTE - PROVIDER TOKENS
PROVIDER:[TOKEN:[[id number]]],PROVIDER:[TOKEN:[[id number]]],PROVIDER:[TOKEN:[[id number]]]

## 2020-10-14 NOTE — ED PROVIDER NOTE - CARE PROVIDERS DIRECT ADDRESSES
,DirectAddress_Unknown,[email or web address],DirectAddress_Unknown

## 2020-10-14 NOTE — ED PROVIDER NOTE - PATIENT PORTAL LINK FT
You can access the FollowMyHealth Patient Portal offered by [facility] by registering at the following website: [email or web address]. By joining [employer]’s FollowMyHealth portal, you will also be able to view your health information using other applications (apps) compatible with our system.

## 2020-10-14 NOTE — ED PROVIDER NOTE - OBJECTIVE STATEMENT
34 yo F with no significant PMHx presents to the ED c/o persisting dysuria, frequency with dribbling of urine x 1 month with development of fever/chills today. Pt states she was prescribed two courses of abx but never finished them. She denies other complaints. Pt denies nausea, vomiting, abdominal pain, diarrhea, headache, dizziness, weakness, chest pain, SOB, back pain, LOC, trauma, cough, calf pain/swelling, recent travel, recent surgery.

## 2020-10-14 NOTE — ED PROVIDER NOTE - NS ED ROS FT
Review of Systems  Constitutional:  (+) fever, chills  Eyes:  No visual changes, eye pain, or discharge.  ENMT:  No hearing changes, pain, or discharge. No nasal congestion, discharge, or bleeding. No throat pain, swelling, or difficulty swallowing.  Cardiac:  No chest pain, palpitations, syncope, or edema.  Respiratory:  No dyspnea, cough. No hemoptysis.  GI:  No vomiting, diarrhea, or abdominal pain. (+) nausea  :  No hematuria. (+) frequency, dysuria  MS:  No back pain.  Skin:  No skin rash, pruritis, jaundice, or lesions.  Neuro:  No headache, dizziness, loss of sensation, or focal weakness.  No change in mental status.   Endocrine: No history of thyroid disease or diabetes.

## 2020-10-14 NOTE — ED PROVIDER NOTE - CARE PROVIDER_API CALL
Enmanuel Aguilar  Infectious Disease  [address] [place] [address]  Phone: [phone]  Fax: [phone]  Follow Up Time:     Mona Vargas)  Female Pelvic MedReconst Surg; Obstetrics and Gynecology  [address] [place] [address]  Phone: [phone]  Fax: [phone]  Follow Up Time:     Leonor Acevedo  Urology  [address] [facility], [address]  [place] [address]  Phone: [phone]  Fax: [phone]  Follow Up Time:

## 2020-10-14 NOTE — ED PROVIDER NOTE - NSFOLLOWUPINSTRUCTIONS_ED_ALL_ED_FT
Urinary Tract Infection, Adult       A urinary tract infection (UTI) is an infection of any part of the urinary tract. The urinary tract includes the kidneys, ureters, bladder, and urethra. These organs make, store, and get rid of urine in the body.    Your health care provider may use other names to describe the infection. An upper UTI affects the ureters and kidneys (pyelonephritis). A lower UTI affects the bladder (cystitis) and urethra (urethritis).      What are the causes?    Most urinary tract infections are caused by bacteria in your genital area, around the entrance to your urinary tract (urethra). These bacteria grow and cause inflammation of your urinary tract.      What increases the risk?  You are more likely to develop this condition if:  •You have a urinary catheter that stays in place (indwelling).      •You are not able to control when you urinate or have a bowel movement (you have incontinence).    •You are female and you:  •Use a spermicide or diaphragm for birth control.      •Have low estrogen levels.      •Are pregnant.        •You have certain genes that increase your risk (genetics).      •You are sexually active.      •You take antibiotic medicines.    •You have a condition that causes your flow of urine to slow down, such as:  •An enlarged prostate, if you are male.      •Blockage in your urethra (stricture).      •A kidney stone.      •A nerve condition that affects your bladder control (neurogenic bladder).      •Not getting enough to drink, or not urinating often.      •You have certain medical conditions, such as:  •Diabetes.      •A weak disease-fighting system (immunesystem).      •Sickle cell disease.      •Gout.      •Spinal cord injury.          What are the signs or symptoms?  Symptoms of this condition include:  •Needing to urinate right away (urgently).      •Frequent urination or passing small amounts of urine frequently.      •Pain or burning with urination.      •Blood in the urine.      •Urine that smells bad or unusual.      •Trouble urinating.      •Cloudy urine.      •Vaginal discharge, if you are female.      •Pain in the abdomen or the lower back.    You may also have:  •Vomiting or a decreased appetite.      •Confusion.      •Irritability or tiredness.      •A fever.      •Diarrhea.      The first symptom in older adults may be confusion. In some cases, they may not have any symptoms until the infection has worsened.      How is this diagnosed?  This condition is diagnosed based on your medical history and a physical exam. You may also have other tests, including:  •Urine tests.      •Blood tests.      •Tests for sexually transmitted infections (STIs).      If you have had more than one UTI, a cystoscopy or imaging studies may be done to determine the cause of the infections.      How is this treated?  Treatment for this condition includes:  •Antibiotic medicine.      •Over-the-counter medicines to treat discomfort.      •Drinking enough water to stay hydrated.      If you have frequent infections or have other conditions such as a kidney stone, you may need to see a health care provider who specializes in the urinary tract (urologist).    In rare cases, urinary tract infections can cause sepsis. Sepsis is a life-threatening condition that occurs when the body responds to an infection. Sepsis is treated in the hospital with IV antibiotics, fluids, and other medicines.      Follow these instructions at home:     Medicines     •Take over-the-counter and prescription medicines only as told by your health care provider.      •If you were prescribed an antibiotic medicine, take it as told by your health care provider. Do not stop using the antibiotic even if you start to feel better.      General instructions   •Make sure you:  •Empty your bladder often and completely. Do not hold urine for long periods of time.      •Empty your bladder after sex.      •Wipe from front to back after a bowel movement if you are female. Use each tissue one time when you wipe.        •Drink enough fluid to keep your urine pale yellow.      •Keep all follow-up visits as told by your health care provider. This is important.        Contact a health care provider if:    •Your symptoms do not get better after 1–2 days.      •Your symptoms go away and then return.        Get help right away if you have:    •Severe pain in your back or your lower abdomen.      •A fever.      •Nausea or vomiting.        Summary    •A urinary tract infection (UTI) is an infection of any part of the urinary tract, which includes the kidneys, ureters, bladder, and urethra.      •Most urinary tract infections are caused by bacteria in your genital area, around the entrance to your urinary tract (urethra).      •Treatment for this condition often includes antibiotic medicines.      •If you were prescribed an antibiotic medicine, take it as told by your health care provider. Do not stop using the antibiotic even if you start to feel better.      •Keep all follow-up visits as told by your health care provider. This is important.      This information is not intended to replace advice given to you by your health care provider. Make sure you discuss any questions you have with your health care provider.

## 2020-10-15 LAB
CULTURE RESULTS: SIGNIFICANT CHANGE UP
SPECIMEN SOURCE: SIGNIFICANT CHANGE UP

## 2020-10-19 DIAGNOSIS — Z71.89 OTHER SPECIFIED COUNSELING: ICD-10-CM

## 2020-10-19 LAB
CULTURE RESULTS: SIGNIFICANT CHANGE UP
CULTURE RESULTS: SIGNIFICANT CHANGE UP
SPECIMEN SOURCE: SIGNIFICANT CHANGE UP
SPECIMEN SOURCE: SIGNIFICANT CHANGE UP

## 2020-11-01 PROCEDURE — G9005: CPT

## 2021-01-02 ENCOUNTER — EMERGENCY (EMERGENCY)
Facility: HOSPITAL | Age: 36
LOS: 0 days | Discharge: HOME | End: 2021-01-02
Attending: EMERGENCY MEDICINE | Admitting: EMERGENCY MEDICINE
Payer: MEDICAID

## 2021-01-02 VITALS
WEIGHT: 156.09 LBS | HEIGHT: 64 IN | TEMPERATURE: 98 F | OXYGEN SATURATION: 99 % | DIASTOLIC BLOOD PRESSURE: 58 MMHG | RESPIRATION RATE: 17 BRPM | SYSTOLIC BLOOD PRESSURE: 121 MMHG | HEART RATE: 64 BPM

## 2021-01-02 DIAGNOSIS — L30.9 DERMATITIS, UNSPECIFIED: ICD-10-CM

## 2021-01-02 DIAGNOSIS — E78.5 HYPERLIPIDEMIA, UNSPECIFIED: ICD-10-CM

## 2021-01-02 DIAGNOSIS — Z88.0 ALLERGY STATUS TO PENICILLIN: ICD-10-CM

## 2021-01-02 DIAGNOSIS — R21 RASH AND OTHER NONSPECIFIC SKIN ERUPTION: ICD-10-CM

## 2021-01-02 DIAGNOSIS — Z79.2 LONG TERM (CURRENT) USE OF ANTIBIOTICS: ICD-10-CM

## 2021-01-02 DIAGNOSIS — Z79.1 LONG TERM (CURRENT) USE OF NON-STEROIDAL ANTI-INFLAMMATORIES (NSAID): ICD-10-CM

## 2021-01-02 DIAGNOSIS — Z79.899 OTHER LONG TERM (CURRENT) DRUG THERAPY: ICD-10-CM

## 2021-01-02 DIAGNOSIS — Z91.013 ALLERGY TO SEAFOOD: ICD-10-CM

## 2021-01-02 PROCEDURE — 99283 EMERGENCY DEPT VISIT LOW MDM: CPT

## 2021-01-02 RX ORDER — DIPHENHYDRAMINE HCL 50 MG
2 CAPSULE ORAL
Qty: 40 | Refills: 0
Start: 2021-01-02 | End: 2021-01-06

## 2021-01-02 RX ORDER — DEXAMETHASONE 0.5 MG/5ML
10 ELIXIR ORAL ONCE
Refills: 0 | Status: COMPLETED | OUTPATIENT
Start: 2021-01-02 | End: 2021-01-02

## 2021-01-02 RX ADMIN — Medication 10 MILLIGRAM(S): at 01:59

## 2021-01-02 NOTE — ED ADULT TRIAGE NOTE - CHIEF COMPLAINT QUOTE
Pulmonary Progress Note        NAME: Burton Mixon - ROOM: [facility]993-I - MRN: [id number] - Age: 68year old - : 3/4/1937        SUBJECTIVE: anxious to leave    OBJECTIVE:   17  0103 17  0406 17  0805 17  1121   BP: 130/75 131/7 for input(s): ALT, AST, ALB, AMYLASE, LIPASE, LDH in the last 72 hours. Invalid input(s): ALPHOS, TBIL, DBIL, TPROT    Invalid input(s): ARTERIALPH, ARTERIALPO2, ARTERIALPCO2, ARTERIALHCO3    No results for input(s): BNP in the last 72 hours.     Invalid needed from pulm perspective  3. MIKE:  -CPAP w/ sleep  4. Proph:  - SCDs  5.  Dispo  -stable to d/c from pulm perspective  -can f/u w/ Dr. Noman Whitmore in 1 mth w/ chest x-ray at that time      [facility] Pulmonary and Critical Care I have a rash all over here (abdominal) for three or four days. its spreading - patient

## 2021-01-02 NOTE — ED PROVIDER NOTE - PATIENT PORTAL LINK FT
You can access the FollowMyHealth Patient Portal offered by [facility] by registering at the following website: [email or web address]. By joining [employer]’s FollowMyHealth portal, you will also be able to view your health information using other applications (apps) compatible with our system.

## 2021-01-02 NOTE — ED PROVIDER NOTE - PHYSICAL EXAMINATION
CONST: Well appearing in NAD  EYES: PERRL, EOMI, Sclera and conjunctiva clear.  NECK: Non-tender, no meningeal signs. normal ROM. supple   CARD: Normal S1 S2; Normal rate and rhythm  RESP: Equal BS B/L, No wheezes, rhonchi or rales. No distress  GI: Soft, non-tender, non-distended. no cva tenderness. normal BS  MS: Normal ROM in all extremities. No midline spinal tenderness. pulses 2 +. no calf tenderness or swelling  SKIN: maculopapular rash to abdomen and back. Warm, dry, no acute rashes. Good turgor

## 2021-01-02 NOTE — ED PROVIDER NOTE - CLINICAL SUMMARY MEDICAL DECISION MAKING FREE TEXT BOX
patient remained stable in ED, pregnancy test was ordered, patient stated that, she just urinated before coming in to the ED, and does not have the urge to urinate, she just completed her period and she does not think she is pregnant, stated that, she has kids waiting outside in the waiting area and wanted to expedite her care. Patient remained awake, alert, ambulatory and comfortable, tolerated PO. Discussed with patient in detail about the need for close outpatient follow up and the need to return to ED for any persistent, or worsening symptoms, for any new symptoms/concerns. patient verbalized understanding and agreed. patient is given detail aftercare instructions and is instructed well to f/u as outpatient for further care.

## 2021-01-02 NOTE — ED PROVIDER NOTE - OBJECTIVE STATEMENT
35 year old female with no pmxh presents with pruritic rash to abdomen and back x 4 days. Pts son had similar symptoms 1 week ago. Pt denies new medication or foods. no fever, chills, throat tightness, chest pain, sob, abd pain, or nausea, vomiting, diarrhea. has not taken any medications for this rash.

## 2021-01-02 NOTE — ED ADULT NURSE NOTE - NSIMPLEMENTINTERV_GEN_ALL_ED
Implemented All Universal Safety Interventions:  [place] to call system. Call bell, personal items and telephone within reach. Instruct patient to call for assistance. Room bathroom lighting operational. Non-slip footwear when patient is off stretcher. Physically safe environment: no spills, clutter or unnecessary equipment. Stretcher in lowest position, wheels locked, appropriate side rails in place.

## 2021-01-02 NOTE — ED PROVIDER NOTE - ATTENDING CONTRIBUTION TO CARE
patient is c/o itching and rash x 4 days. Patient states that initially her son had rash on his face, which improved with benadryl, patient started having rash 4 days ago, which started on her abdominal area, and continued to spread, associated with itching, denies fever/chills, denies cough/congestion, denies any use of new products/medications. Denies travel.   Vitals reviewed.   patient is awake, alert, speaking in full sentences, appears comfortable and not in distress.   lungs: CTA, no crackles  Skin: generalized maculopapular scaly rash noted on the lower chest and abdominal area, all the rash areas are blanching and non-tender. No vesicles, no pustules, no petechiae, no purpura, no hot to touch, no crepitus.   CNS; awake, alert, o x 3, no meningeal signs noted.   A/P: Maculopapular scaly rash  symptomatic treatment  topical steroids  close outpatient follow up with PMD and dermatology.

## 2021-02-25 ENCOUNTER — ASOB RESULT (OUTPATIENT)
Age: 36
End: 2021-02-25

## 2021-02-25 ENCOUNTER — NON-APPOINTMENT (OUTPATIENT)
Age: 36
End: 2021-02-25

## 2021-02-25 ENCOUNTER — OUTPATIENT (OUTPATIENT)
Dept: OUTPATIENT SERVICES | Facility: HOSPITAL | Age: 36
LOS: 1 days | Discharge: HOME | End: 2021-02-25

## 2021-02-25 ENCOUNTER — APPOINTMENT (OUTPATIENT)
Dept: ANTEPARTUM | Facility: CLINIC | Age: 36
End: 2021-02-25
Payer: MEDICAID

## 2021-02-25 PROBLEM — Z00.00 ENCOUNTER FOR PREVENTIVE HEALTH EXAMINATION: Status: ACTIVE | Noted: 2021-02-25

## 2021-02-25 PROCEDURE — 76801 OB US < 14 WKS SINGLE FETUS: CPT | Mod: 26

## 2021-03-10 ENCOUNTER — RESULT CHARGE (OUTPATIENT)
Age: 36
End: 2021-03-10

## 2021-03-10 ENCOUNTER — NON-APPOINTMENT (OUTPATIENT)
Age: 36
End: 2021-03-10

## 2021-03-10 ENCOUNTER — OUTPATIENT (OUTPATIENT)
Dept: OUTPATIENT SERVICES | Facility: HOSPITAL | Age: 36
LOS: 1 days | Discharge: HOME | End: 2021-03-10

## 2021-03-10 ENCOUNTER — APPOINTMENT (OUTPATIENT)
Dept: ANTEPARTUM | Facility: CLINIC | Age: 36
End: 2021-03-10
Payer: MEDICAID

## 2021-03-10 VITALS
HEIGHT: 66 IN | HEART RATE: 100 BPM | TEMPERATURE: 98.7 F | OXYGEN SATURATION: 99 % | WEIGHT: 158 LBS | BODY MASS INDEX: 25.39 KG/M2 | DIASTOLIC BLOOD PRESSURE: 86 MMHG | SYSTOLIC BLOOD PRESSURE: 116 MMHG

## 2021-03-10 LAB
BILIRUB UR QL STRIP: NORMAL
CLARITY UR: CLEAR
COLLECTION METHOD: NORMAL
FETAL HEART RATE (BPM): 158
GLUCOSE UR-MCNC: NORMAL
HCG UR QL: 0.2 EU/DL
HGB UR QL STRIP.AUTO: NORMAL
KETONES UR-MCNC: NORMAL
LEUKOCYTE ESTERASE UR QL STRIP: NORMAL
NITRITE UR QL STRIP: NORMAL
OB COMMENTS: NORMAL
PH UR STRIP: 7
PROT UR STRIP-MCNC: NORMAL
SP GR UR STRIP: 1.01
URINE ALBUMIN/PROTEIN: NORMAL
URINE GLUCOSE: NORMAL
URINE KETONES: NORMAL
WEEKS GESTATION: NORMAL

## 2021-03-10 PROCEDURE — 99214 OFFICE O/P EST MOD 30 MIN: CPT

## 2021-03-10 PROCEDURE — 99204 OFFICE O/P NEW MOD 45 MIN: CPT

## 2021-03-12 DIAGNOSIS — O09.291 SUPERVISION OF PREGNANCY WITH OTHER POOR REPRODUCTIVE OR OBSTETRIC HISTORY, FIRST TRIMESTER: ICD-10-CM

## 2021-03-12 DIAGNOSIS — O98.719 HUMAN IMMUNODEFICIENCY VIRUS [HIV] DISEASE COMPLICATING PREGNANCY, UNSPECIFIED TRIMESTER: ICD-10-CM

## 2021-03-12 DIAGNOSIS — O98.711 HUMAN IMMUNODEFICIENCY VIRUS [HIV] DISEASE COMPLICATING PREGNANCY, FIRST TRIMESTER: ICD-10-CM

## 2021-03-12 DIAGNOSIS — Z3A.11 11 WEEKS GESTATION OF PREGNANCY: ICD-10-CM

## 2021-03-15 LAB
C TRACH RRNA SPEC QL NAA+PROBE: NOT DETECTED
N GONORRHOEA RRNA SPEC QL NAA+PROBE: NOT DETECTED
SOURCE AMPLIFICATION: NORMAL

## 2021-03-19 LAB — CYTOLOGY CVX/VAG DOC THIN PREP: NORMAL

## 2021-03-24 ENCOUNTER — APPOINTMENT (OUTPATIENT)
Dept: ANTEPARTUM | Facility: CLINIC | Age: 36
End: 2021-03-24

## 2021-03-26 ENCOUNTER — LABORATORY RESULT (OUTPATIENT)
Age: 36
End: 2021-03-26

## 2021-03-30 LAB
ABO + RH PNL BLD: NORMAL
ALBUMIN SERPL ELPH-MCNC: 3.9 G/DL
ALP BLD-CCNC: 40 U/L
ALT SERPL-CCNC: 11 U/L
ANION GAP SERPL CALC-SCNC: 13 MMOL/L
AST SERPL-CCNC: 13 U/L
BACTERIA UR CULT: NORMAL
BASOPHILS # BLD AUTO: 0.02 K/UL
BASOPHILS NFR BLD AUTO: 0.3 %
BILIRUB SERPL-MCNC: 0.2 MG/DL
BLD GP AB SCN SERPL QL: NORMAL
BUN SERPL-MCNC: 9 MG/DL
CALCIUM SERPL-MCNC: 9.3 MG/DL
CD16+CD56+ CELLS # BLD: 123 /UL
CD16+CD56+ CELLS NFR BLD: 6 %
CD19 CELLS NFR BLD: 159 /UL
CD3 CELLS # BLD: 1598 /UL
CD3 CELLS NFR BLD: 84 %
CD3+CD4+ CELLS # BLD: 471 /UL
CD3+CD4+ CELLS NFR BLD: 25 %
CD3+CD4+ CELLS/CD3+CD8+ CLL SPEC: 0.46 RATIO
CD3+CD8+ CELLS # SPEC: 1020 /UL
CD3+CD8+ CELLS NFR BLD: 54 %
CELLS.CD3-CD19+/CELLS IN BLOOD: 8 %
CHLORIDE SERPL-SCNC: 100 MMOL/L
CMV IGG SERPL QL: >10 U/ML
CMV IGG SERPL-IMP: POSITIVE
CMV IGM SERPL QL: <8 AU/ML
CMV IGM SERPL QL: NEGATIVE
CO2 SERPL-SCNC: 20 MMOL/L
CREAT SERPL-MCNC: 0.5 MG/DL
EOSINOPHIL # BLD AUTO: 0.08 K/UL
EOSINOPHIL NFR BLD AUTO: 1.1 %
GLUCOSE SERPL-MCNC: 92 MG/DL
HBV SURFACE AG SER QL: NONREACTIVE
HCT VFR BLD CALC: 30.2 %
HGB A MFR BLD: 96.9 %
HGB A2 MFR BLD: 3.1 %
HGB BLD-MCNC: 10.1 G/DL
HGB FRACT BLD-IMP: NORMAL
IMM GRANULOCYTES NFR BLD AUTO: 0.7 %
LEAD BLD-MCNC: <1 UG/DL
LYMPHOCYTES # BLD AUTO: 1.88 K/UL
LYMPHOCYTES NFR BLD AUTO: 26.4 %
M TB IFN-G BLD-IMP: NEGATIVE
MAN DIFF?: NORMAL
MCHC RBC-ENTMCNC: 29.4 PG
MCHC RBC-ENTMCNC: 33.4 G/DL
MCV RBC AUTO: 87.8 FL
MEV IGG FLD QL IA: 37.5 AU/ML
MEV IGG+IGM SER-IMP: POSITIVE
MONOCYTES # BLD AUTO: 0.53 K/UL
MONOCYTES NFR BLD AUTO: 7.4 %
NEUTROPHILS # BLD AUTO: 4.57 K/UL
NEUTROPHILS NFR BLD AUTO: 64.1 %
PLATELET # BLD AUTO: 261 K/UL
POTASSIUM SERPL-SCNC: 3.7 MMOL/L
PROT SERPL-MCNC: 7.2 G/DL
QUANTIFERON TB PLUS MITOGEN MINUS NIL: 3.69 IU/ML
QUANTIFERON TB PLUS NIL: 0.02 IU/ML
QUANTIFERON TB PLUS TB1 MINUS NIL: 0 IU/ML
QUANTIFERON TB PLUS TB2 MINUS NIL: 0 IU/ML
RBC # BLD: 3.44 M/UL
RBC # FLD: 13.3 %
RUBV IGG FLD-ACNC: 3.8 INDEX
RUBV IGG SER-IMP: POSITIVE
SODIUM SERPL-SCNC: 133 MMOL/L
T GONDII AB SER-IMP: NEGATIVE
T GONDII AB SER-IMP: NEGATIVE
T GONDII IGG SER QL: <3 IU/ML
T GONDII IGM SER QL: <3 AU/ML
T PALLIDUM AB SER QL IA: NEGATIVE
VZV AB TITR SER: POSITIVE
VZV IGG SER IF-ACNC: 2429 INDEX
WBC # FLD AUTO: 7.13 K/UL

## 2021-04-01 LAB — FMR1 GENE MUT ANL BLD/T: NORMAL

## 2021-04-05 LAB
AR GENE MUT ANL BLD/T: NORMAL
CMV IGG AVIDITY SERPL IA-RTO: 0.97
HIV1 RNA # SERPL NAA+PROBE: <40 COPIES/ML
HIV1 RNA # SERPL NAA+PROBE: DETECTED
VIRAL LOAD LOG: <1.6 LG COP/ML

## 2021-04-07 ENCOUNTER — APPOINTMENT (OUTPATIENT)
Dept: ANTEPARTUM | Facility: CLINIC | Age: 36
End: 2021-04-07
Payer: MEDICAID

## 2021-04-07 ENCOUNTER — NON-APPOINTMENT (OUTPATIENT)
Age: 36
End: 2021-04-07

## 2021-04-07 ENCOUNTER — OUTPATIENT (OUTPATIENT)
Dept: OUTPATIENT SERVICES | Facility: HOSPITAL | Age: 36
LOS: 1 days | Discharge: HOME | End: 2021-04-07

## 2021-04-07 ENCOUNTER — RESULT CHARGE (OUTPATIENT)
Age: 36
End: 2021-04-07

## 2021-04-07 VITALS
WEIGHT: 160 LBS | DIASTOLIC BLOOD PRESSURE: 60 MMHG | HEIGHT: 66 IN | TEMPERATURE: 98 F | HEART RATE: 69 BPM | SYSTOLIC BLOOD PRESSURE: 122 MMHG | BODY MASS INDEX: 25.71 KG/M2 | OXYGEN SATURATION: 100 %

## 2021-04-07 LAB
BILIRUB UR QL STRIP: NORMAL
CLARITY UR: CLEAR
COLLECTION METHOD: NORMAL
FETAL HEART RATE (BPM): 142
FETAL MOVEMENT: PRESENT
GLUCOSE 1H P 50 G GLC PO SERPL-MCNC: 108 MG/DL
GLUCOSE UR-MCNC: NORMAL
HCG UR QL: 0.2 EU/DL
HGB UR QL STRIP.AUTO: NORMAL
KETONES UR-MCNC: NORMAL
LEUKOCYTE ESTERASE UR QL STRIP: NORMAL
NITRITE UR QL STRIP: NORMAL
OB COMMENTS: NORMAL
PH UR STRIP: 7.5
PROT UR STRIP-MCNC: NORMAL
SP GR UR STRIP: 1.01
URINE ALBUMIN/PROTEIN: NORMAL
URINE GLUCOSE: NORMAL
URINE KETONES: NORMAL
WEEKS GESTATION: NORMAL

## 2021-04-07 PROCEDURE — 99214 OFFICE O/P EST MOD 30 MIN: CPT

## 2021-04-08 LAB — CFTR MUT TESTED BLD/T: POSITIVE

## 2021-04-09 ENCOUNTER — NON-APPOINTMENT (OUTPATIENT)
Age: 36
End: 2021-04-09

## 2021-04-09 ENCOUNTER — APPOINTMENT (OUTPATIENT)
Dept: ANTEPARTUM | Facility: CLINIC | Age: 36
End: 2021-04-09

## 2021-04-09 NOTE — HISTORY OF PRESENT ILLNESS
[Home] : at home, [unfilled] , at the time of the visit. [Medical Office: ([facility])___] : at the medical office located in  [Partner] : partner [Verbal consent obtained from patient] : the patient, [unfilled]

## 2021-04-22 ENCOUNTER — NON-APPOINTMENT (OUTPATIENT)
Age: 36
End: 2021-04-22

## 2021-05-10 ENCOUNTER — NON-APPOINTMENT (OUTPATIENT)
Age: 36
End: 2021-05-10

## 2021-05-13 ENCOUNTER — LABORATORY RESULT (OUTPATIENT)
Age: 36
End: 2021-05-13

## 2021-05-14 ENCOUNTER — APPOINTMENT (OUTPATIENT)
Dept: ANTEPARTUM | Facility: CLINIC | Age: 36
End: 2021-05-14
Payer: MEDICAID

## 2021-05-14 ENCOUNTER — NON-APPOINTMENT (OUTPATIENT)
Age: 36
End: 2021-05-14

## 2021-05-14 ENCOUNTER — ASOB RESULT (OUTPATIENT)
Age: 36
End: 2021-05-14

## 2021-05-14 ENCOUNTER — OUTPATIENT (OUTPATIENT)
Dept: OUTPATIENT SERVICES | Facility: HOSPITAL | Age: 36
LOS: 1 days | Discharge: HOME | End: 2021-05-14

## 2021-05-14 VITALS
HEART RATE: 83 BPM | TEMPERATURE: 98.5 F | DIASTOLIC BLOOD PRESSURE: 52 MMHG | SYSTOLIC BLOOD PRESSURE: 106 MMHG | WEIGHT: 168 LBS | BODY MASS INDEX: 27.12 KG/M2 | OXYGEN SATURATION: 100 %

## 2021-05-14 LAB
BILIRUB UR QL STRIP: NEGATIVE
CLARITY UR: CLEAR
COLLECTION METHOD: NORMAL
FETAL MOVEMENT: PRESENT
GLUCOSE UR-MCNC: NEGATIVE
HCG UR QL: 0.2 EU/DL
HGB UR QL STRIP.AUTO: NEGATIVE
KETONES UR-MCNC: NEGATIVE
LEUKOCYTE ESTERASE UR QL STRIP: NORMAL
NITRITE UR QL STRIP: NEGATIVE
PH UR STRIP: 6.5
PROT UR STRIP-MCNC: NEGATIVE
SCHEDULED VISIT: YES
SP GR UR STRIP: 1.01
URINE ALBUMIN/PROTEIN: NEGATIVE
URINE GLUCOSE: NEGATIVE
URINE KETONES: NORMAL
WEEKS GESTATION: 20.4

## 2021-05-14 PROCEDURE — 99214 OFFICE O/P EST MOD 30 MIN: CPT

## 2021-05-14 PROCEDURE — 76817 TRANSVAGINAL US OBSTETRIC: CPT | Mod: 26

## 2021-05-14 PROCEDURE — 76811 OB US DETAILED SNGL FETUS: CPT | Mod: 26

## 2021-05-17 DIAGNOSIS — Z36.89 ENCOUNTER FOR OTHER SPECIFIED ANTENATAL SCREENING: ICD-10-CM

## 2021-05-17 DIAGNOSIS — O35.9XX0 MATERNAL CARE FOR (SUSPECTED) FETAL ABNORMALITY AND DAMAGE, UNSPECIFIED, NOT APPLICABLE OR UNSPECIFIED: ICD-10-CM

## 2021-05-17 DIAGNOSIS — Z3A.20 20 WEEKS GESTATION OF PREGNANCY: ICD-10-CM

## 2021-05-17 DIAGNOSIS — Z36.3 ENCOUNTER FOR ANTENATAL SCREENING FOR MALFORMATIONS: ICD-10-CM

## 2021-05-17 DIAGNOSIS — O98.719 HUMAN IMMUNODEFICIENCY VIRUS [HIV] DISEASE COMPLICATING PREGNANCY, UNSPECIFIED TRIMESTER: ICD-10-CM

## 2021-05-17 DIAGNOSIS — O28.3 ABNORMAL ULTRASONIC FINDING ON ANTENATAL SCREENING OF MOTHER: ICD-10-CM

## 2021-06-11 ENCOUNTER — NON-APPOINTMENT (OUTPATIENT)
Age: 36
End: 2021-06-11

## 2021-06-11 ENCOUNTER — OUTPATIENT (OUTPATIENT)
Dept: OUTPATIENT SERVICES | Facility: HOSPITAL | Age: 36
LOS: 1 days | Discharge: HOME | End: 2021-06-11

## 2021-06-11 ENCOUNTER — APPOINTMENT (OUTPATIENT)
Dept: ANTEPARTUM | Facility: CLINIC | Age: 36
End: 2021-06-11
Payer: MEDICAID

## 2021-06-11 VITALS
HEART RATE: 75 BPM | SYSTOLIC BLOOD PRESSURE: 122 MMHG | BODY MASS INDEX: 27.92 KG/M2 | OXYGEN SATURATION: 100 % | WEIGHT: 173 LBS | TEMPERATURE: 98.2 F | DIASTOLIC BLOOD PRESSURE: 68 MMHG

## 2021-06-11 LAB
BILIRUB UR QL STRIP: NEGATIVE
CLARITY UR: CLEAR
FETAL HEART DESCRIPTION: NORMAL
FETAL HEART RATE (BPM): 140
FETAL MOVEMENT: PRESENT
GLUCOSE UR-MCNC: NEGATIVE
HCG UR QL: 0.2 EU/DL
HGB UR QL STRIP.AUTO: NEGATIVE
KETONES UR-MCNC: NEGATIVE
LEUKOCYTE ESTERASE UR QL STRIP: NEGATIVE
NITRITE UR QL STRIP: NEGATIVE
PH UR STRIP: 7
PROT UR STRIP-MCNC: NEGATIVE
SCHEDULED VISIT: YES
SP GR UR STRIP: 1.01
URINE ALBUMIN/PROTEIN: NEGATIVE
URINE GLUCOSE: NEGATIVE
URINE KETONES: NEGATIVE
WEEKS GESTATION: 24.4

## 2021-06-11 PROCEDURE — 99214 OFFICE O/P EST MOD 30 MIN: CPT

## 2021-06-14 DIAGNOSIS — O28.3 ABNORMAL ULTRASONIC FINDING ON ANTENATAL SCREENING OF MOTHER: ICD-10-CM

## 2021-06-14 DIAGNOSIS — Z3A.24 24 WEEKS GESTATION OF PREGNANCY: ICD-10-CM

## 2021-06-14 DIAGNOSIS — O98.719 HUMAN IMMUNODEFICIENCY VIRUS [HIV] DISEASE COMPLICATING PREGNANCY, UNSPECIFIED TRIMESTER: ICD-10-CM

## 2021-06-16 ENCOUNTER — EMERGENCY (EMERGENCY)
Facility: HOSPITAL | Age: 36
LOS: 0 days | Discharge: HOME | End: 2021-06-17
Attending: EMERGENCY MEDICINE | Admitting: EMERGENCY MEDICINE
Payer: MEDICAID

## 2021-06-16 VITALS
TEMPERATURE: 99 F | DIASTOLIC BLOOD PRESSURE: 52 MMHG | OXYGEN SATURATION: 100 % | HEART RATE: 100 BPM | SYSTOLIC BLOOD PRESSURE: 104 MMHG | RESPIRATION RATE: 18 BRPM

## 2021-06-16 VITALS
OXYGEN SATURATION: 100 % | RESPIRATION RATE: 18 BRPM | HEIGHT: 64 IN | SYSTOLIC BLOOD PRESSURE: 116 MMHG | WEIGHT: 175.05 LBS | DIASTOLIC BLOOD PRESSURE: 65 MMHG | TEMPERATURE: 98 F | HEART RATE: 113 BPM

## 2021-06-16 DIAGNOSIS — O99.891 OTHER SPECIFIED DISEASES AND CONDITIONS COMPLICATING PREGNANCY: ICD-10-CM

## 2021-06-16 DIAGNOSIS — E78.5 HYPERLIPIDEMIA, UNSPECIFIED: ICD-10-CM

## 2021-06-16 DIAGNOSIS — Z88.0 ALLERGY STATUS TO PENICILLIN: ICD-10-CM

## 2021-06-16 DIAGNOSIS — Z91.013 ALLERGY TO SEAFOOD: ICD-10-CM

## 2021-06-16 DIAGNOSIS — Z79.899 OTHER LONG TERM (CURRENT) DRUG THERAPY: ICD-10-CM

## 2021-06-16 DIAGNOSIS — O98.512 OTHER VIRAL DISEASES COMPLICATING PREGNANCY, SECOND TRIMESTER: ICD-10-CM

## 2021-06-16 DIAGNOSIS — B34.9 VIRAL INFECTION, UNSPECIFIED: ICD-10-CM

## 2021-06-16 DIAGNOSIS — Z20.822 CONTACT WITH AND (SUSPECTED) EXPOSURE TO COVID-19: ICD-10-CM

## 2021-06-16 DIAGNOSIS — Z3A.26 26 WEEKS GESTATION OF PREGNANCY: ICD-10-CM

## 2021-06-16 DIAGNOSIS — R09.81 NASAL CONGESTION: ICD-10-CM

## 2021-06-16 PROCEDURE — 99284 EMERGENCY DEPT VISIT MOD MDM: CPT | Mod: 25

## 2021-06-16 PROCEDURE — 76815 OB US LIMITED FETUS(S): CPT | Mod: 26

## 2021-06-16 NOTE — ED PROVIDER NOTE - PHYSICAL EXAMINATION
VITALS: Reviewed  CONSTITUTIONAL: well developed, well nourished, in no acute distress, speaking in full sentences, nontoxic appearing  SKIN: warm, dry, no rash  HEAD: normocephalic, atraumatic  EYES: PERRL, EOMI, no conjunctival erythema, sclera clear  ENT: patent airway, moist mucous membranes  NECK: supple, no masses  CV:  regular rate, regular rhythm, 2+ radial pulses bilaterally  RESP: no wheezes, no rales, no rhonchi, normal work of breathing  ABD: gravid, soft, nontender, nondistended, no rebound, no guarding  MSK: normal ROM, no cyanosis, no edema  NEURO: alert, oriented x3  PSYCH: cooperative, appropriate

## 2021-06-16 NOTE — ED ADULT TRIAGE NOTE - CHIEF COMPLAINT QUOTE
I just don't feel good, I have a headache, I have body aches, my stomach is in knots, my eyes and nose is real stuffy - patient     Patient is 24 weeks pregnant

## 2021-06-16 NOTE — ED PROVIDER NOTE - ATTENDING CONTRIBUTION TO CARE
I personally evaluated the patient. I reviewed the Resident’s or Physician Assistant’s note (as assigned above), and agree with the findings and plan except as documented in my note.    37 y/o F ~26 weeks gestation  presents w nasal congestion, cough. No rash. No fever. Reports some body aches. No dysuria or hematuria.     CONSTITUTIONAL: Well-developed; well-nourished; in no acute distress. Sitting up and providing appropriate history and physical examination  SKIN: skin exam is warm and dry, no acute rash.  HEAD: Normocephalic; atraumatic.  EYES: PERRL, 3 mm bilateral, no nystagmus, EOM intact; conjunctiva and sclera clear.  ENT: nasal congestion  NECK: Supple; non tender.+ full passive ROM in all directions. No JVD  CARD: S1, S2 normal; no murmurs, gallops, or rubs. Regular rate and rhythm. + Symmetric Strong Pulses  RESP: No wheezes, rales or rhonchi. Good air movement bilaterally  ABD: soft; gravid, nt  EXT: Normal ROM. No clubbing, cyanosis or edema. Dp and Pt Pulses intact. Cap refill less than 3 seconds  NEURO: CN 2-12 intact, normal finger to nose, normal romberg, stable gait, no sensory or motor deficits, Alert, oriented, grossly unremarkable. No Focal deficits. GCS 15. NIH 0  PSYCH: Cooperative, appropriate.      Plan- COVID swab, reassess

## 2021-06-16 NOTE — ED PROVIDER NOTE - PATIENT PORTAL LINK FT
You can access the FollowMyHealth Patient Portal offered by [facility] by registering at the following website: [email or web address]. By joining [employer]’s FollowMyHealth portal, you will also be able to view your health information using other applications (apps) compatible with our system.

## 2021-06-16 NOTE — ED PROVIDER NOTE - CARE PROVIDER_API CALL
Navarro Weinstein  INTERNAL MEDICINE  [address] [place] [address]  Phone: [phone]  Fax: [phone]  Follow Up Time:

## 2021-06-16 NOTE — ED PROVIDER NOTE - NSFOLLOWUPINSTRUCTIONS_ED_ALL_ED_FT
Viral Syndrome    WHAT YOU NEED TO KNOW:    Viral syndrome is a term used for symptoms of an infection caused by a virus. Viruses are spread easily from person to person through the air and on shared items.    DISCHARGE INSTRUCTIONS:    Call your local emergency number (911 in the [place]) or have someone else call if:   •You have a seizure.      •You cannot be woken.      •You have chest pain or trouble breathing.      Return to the emergency department if:   •You have a stiff neck, a bad headache, and sensitivity to light.      •You feel weak, dizzy, or confused.      •You stop urinating or urinate a lot less than usual.      •You cough up blood or thick yellow or green mucus.      •You have severe abdominal pain or your abdomen is larger than usual.      Call your doctor if:   •Your symptoms do not get better with treatment or get worse after 3 days.      •You have a rash or ear pain.      •You have burning when you urinate.      •You have questions or concerns about your condition or care.      Medicines: You may need any of the following:   •Acetaminophen decreases pain and fever. It is available without a doctor's order. Ask how much to take and how often to take it. Follow directions. Read the labels of all other medicines you are using to see if they also contain acetaminophen, or ask your doctor or pharmacist. Acetaminophen can cause liver damage if not taken correctly. Do not use more than 4 grams (4,000 milligrams) total of acetaminophen in one day.       •NSAIDs, such as ibuprofen, help decrease swelling, pain, and fever. NSAIDs can cause stomach bleeding or kidney problems in certain people. If you take blood thinner medicine, always ask your healthcare provider if NSAIDs are safe for you. Always read the medicine label and follow directions.      •Cold medicine helps decrease swelling, control a cough, and relieve chest or nasal congestion.       •Saline nasal spray helps decrease nasal congestion.       •Take your medicine as directed. Contact your healthcare provider if you think your medicine is not helping or if you have side effects. Tell him of her if you are allergic to any medicine. Keep a list of the medicines, vitamins, and herbs you take. Include the amounts, and when and why you take them. Bring the list or the pill bottles to follow-up visits. Carry your medicine list with you in case of an emergency.      Manage your symptoms:   •Drink liquids as directed to prevent dehydration. Ask how much liquid to drink each day and which liquids are best for you. Ask if you should drink an oral rehydration solution (ORS). An ORS has the right amounts of water, salts, and sugar you need to replace body fluids. This may help prevent dehydration caused by vomiting or diarrhea. Do not drink liquids with caffeine. Liquids with caffeine can make dehydration worse.      •Get plenty of rest to help your body heal. Take naps throughout the day. Ask your healthcare provider when you can return to work and your normal activities.      •Use a cool mist humidifier to help you breathe easier. Ask your healthcare provider how to use a cool mist humidifier.      •Eat honey or use cough drops for a sore throat. Cough drops are available without a doctor's order. Follow directions for taking cough drops.      •Do not smoke or be close to anyone who is smoking. Nicotine and other chemicals in cigarettes and cigars can cause lung damage. Smoking can also delay healing. Ask your healthcare provider for information if you currently smoke and need help to quit. E-cigarettes or smokeless tobacco still contain nicotine. Talk to your healthcare provider before you use these products.      Prevent the spread of germs:          •Wash your hands often. Wash your hands several times each day. Wash after you use the bathroom, change a child's diaper, and before you prepare or eat food. Use soap and water every time. Rub your soapy hands together, lacing your fingers. Wash the front and back of your hands, and in between your fingers. Use the fingers of one hand to scrub under the fingernails of the other hand. Wash for at least 20 seconds. Rinse with warm, running water for several seconds. Then dry your hands with a clean towel or paper towel. Use hand  that contains alcohol if soap and water are not available. Do not touch your eyes, nose, or mouth without washing your hands first.  Handwashing           •Cover a sneeze or cough. Use a tissue that covers your mouth and nose. Throw the tissue away in a trash can right away. Use the bend of your arm if a tissue is not available. Wash your hands well with soap and water or use a hand .      •Stay away from others while you are sick. Avoid crowds as much as possible.      •Ask about vaccines you may need. Talk to your healthcare provider about your vaccine history. He or she will tell you which vaccines you need, and when to get them.?Get the influenza (flu) vaccine as soon as recommended each year. The flu vaccine is available starting in September or October. Flu viruses change, so it is important to get a flu vaccine every year.      ?Get the pneumonia vaccine if recommended. This vaccine is usually recommended every 5 years. Your provider will tell you when to get this vaccine, if needed.        Follow up with your doctor as directed: Write down your questions so you remember to ask them during your visits.

## 2021-06-16 NOTE — ED PROVIDER NOTE - OBJECTIVE STATEMENT
36Y F  presents with CC of congestion for 2 days duration. Patient says that she is 24 weeks pregnant, has been having congestion symptoms since yesterday, no fevers, chills, chest pain, SOB, abdominal pain, N/V/D, vaginal bleeding.

## 2021-06-17 LAB
HCOV PNL SPEC NAA+PROBE: DETECTED
RAPID RVP RESULT: DETECTED
SARS-COV-2 RNA SPEC QL NAA+PROBE: SIGNIFICANT CHANGE UP

## 2021-07-09 ENCOUNTER — NON-APPOINTMENT (OUTPATIENT)
Age: 36
End: 2021-07-09

## 2021-07-09 ENCOUNTER — OUTPATIENT (OUTPATIENT)
Dept: OUTPATIENT SERVICES | Facility: HOSPITAL | Age: 36
LOS: 1 days | Discharge: HOME | End: 2021-07-09

## 2021-07-09 ENCOUNTER — APPOINTMENT (OUTPATIENT)
Dept: ANTEPARTUM | Facility: CLINIC | Age: 36
End: 2021-07-09
Payer: MEDICAID

## 2021-07-09 VITALS
WEIGHT: 176 LBS | TEMPERATURE: 98.5 F | HEART RATE: 82 BPM | DIASTOLIC BLOOD PRESSURE: 57 MMHG | OXYGEN SATURATION: 99 % | SYSTOLIC BLOOD PRESSURE: 109 MMHG | BODY MASS INDEX: 28.41 KG/M2

## 2021-07-09 LAB
BILIRUB UR QL STRIP: NEGATIVE
CLARITY UR: CLEAR
COLLECTION METHOD: NORMAL
FETAL HEART DESCRIPTION: NORMAL
FETAL HEART RATE (BPM): 144
FETAL MOVEMENT: PRESENT
GLUCOSE UR-MCNC: NEGATIVE
HCG UR QL: 0.2 EU/DL
HGB UR QL STRIP.AUTO: NEGATIVE
KETONES UR-MCNC: NEGATIVE
LEUKOCYTE ESTERASE UR QL STRIP: NEGATIVE
NITRITE UR QL STRIP: NEGATIVE
PH UR STRIP: 6.5
PROT UR STRIP-MCNC: NORMAL
SCHEDULED VISIT: YES
SP GR UR STRIP: 1.01
URINE ALBUMIN/PROTEIN: NEGATIVE
URINE GLUCOSE: NEGATIVE
URINE KETONES: NORMAL
WEEKS GESTATION: 28.4

## 2021-07-09 PROCEDURE — 99214 OFFICE O/P EST MOD 30 MIN: CPT

## 2021-07-09 NOTE — END OF VISIT
[] : Resident [FreeTextEntry3] : See prenatal record for details of visit. [Time Spent: ___ minutes] : I have spent [unfilled] minutes of time on the encounter.

## 2021-07-13 DIAGNOSIS — Z3A.28 28 WEEKS GESTATION OF PREGNANCY: ICD-10-CM

## 2021-07-13 DIAGNOSIS — O28.3 ABNORMAL ULTRASONIC FINDING ON ANTENATAL SCREENING OF MOTHER: ICD-10-CM

## 2021-07-13 DIAGNOSIS — O98.719 HUMAN IMMUNODEFICIENCY VIRUS [HIV] DISEASE COMPLICATING PREGNANCY, UNSPECIFIED TRIMESTER: ICD-10-CM

## 2021-07-13 DIAGNOSIS — O09.291 SUPERVISION OF PREGNANCY WITH OTHER POOR REPRODUCTIVE OR OBSTETRIC HISTORY, FIRST TRIMESTER: ICD-10-CM

## 2021-07-21 ENCOUNTER — APPOINTMENT (OUTPATIENT)
Dept: ANTEPARTUM | Facility: CLINIC | Age: 36
End: 2021-07-21

## 2021-08-10 ENCOUNTER — APPOINTMENT (OUTPATIENT)
Dept: ANTEPARTUM | Facility: CLINIC | Age: 36
End: 2021-08-10
Payer: MEDICAID

## 2021-08-10 ENCOUNTER — OUTPATIENT (OUTPATIENT)
Dept: OUTPATIENT SERVICES | Facility: HOSPITAL | Age: 36
LOS: 1 days | Discharge: HOME | End: 2021-08-10

## 2021-08-10 ENCOUNTER — ASOB RESULT (OUTPATIENT)
Age: 36
End: 2021-08-10

## 2021-08-10 ENCOUNTER — NON-APPOINTMENT (OUTPATIENT)
Age: 36
End: 2021-08-10

## 2021-08-10 ENCOUNTER — RESULT CHARGE (OUTPATIENT)
Age: 36
End: 2021-08-10

## 2021-08-10 VITALS
HEIGHT: 66 IN | BODY MASS INDEX: 29.73 KG/M2 | HEART RATE: 99 BPM | OXYGEN SATURATION: 99 % | DIASTOLIC BLOOD PRESSURE: 73 MMHG | SYSTOLIC BLOOD PRESSURE: 123 MMHG | WEIGHT: 185 LBS | TEMPERATURE: 98.5 F

## 2021-08-10 LAB
BILIRUB UR QL STRIP: NORMAL
CLARITY UR: CLEAR
COLLECTION METHOD: NORMAL
FETAL HEART RATE (BPM): 140
FETAL MOVEMENT: PRESENT
GLUCOSE UR-MCNC: NORMAL
HCG UR QL: 0.2 EU/DL
HGB UR QL STRIP.AUTO: NORMAL
KETONES UR-MCNC: NORMAL
LEUKOCYTE ESTERASE UR QL STRIP: NORMAL
NITRITE UR QL STRIP: NORMAL
OB COMMENTS: NORMAL
PH UR STRIP: 6.5
PROT UR STRIP-MCNC: NORMAL
SP GR UR STRIP: 1.01
URINE ALBUMIN/PROTEIN: NORMAL
URINE GLUCOSE: NORMAL
URINE KETONES: NORMAL
WEEKS GESTATION: NORMAL

## 2021-08-10 PROCEDURE — 76816 OB US FOLLOW-UP PER FETUS: CPT | Mod: 26

## 2021-08-10 PROCEDURE — 76819 FETAL BIOPHYS PROFIL W/O NST: CPT | Mod: 26

## 2021-08-10 PROCEDURE — 99213 OFFICE O/P EST LOW 20 MIN: CPT | Mod: 25

## 2021-08-11 ENCOUNTER — LABORATORY RESULT (OUTPATIENT)
Age: 36
End: 2021-08-11

## 2021-08-11 DIAGNOSIS — O98.719 HUMAN IMMUNODEFICIENCY VIRUS [HIV] DISEASE COMPLICATING PREGNANCY, UNSPECIFIED TRIMESTER: ICD-10-CM

## 2021-08-11 DIAGNOSIS — Z3A.33 33 WEEKS GESTATION OF PREGNANCY: ICD-10-CM

## 2021-08-11 DIAGNOSIS — O99.013 ANEMIA COMPLICATING PREGNANCY, THIRD TRIMESTER: ICD-10-CM

## 2021-08-12 ENCOUNTER — NON-APPOINTMENT (OUTPATIENT)
Age: 36
End: 2021-08-12

## 2021-08-12 LAB
BASOPHILS # BLD AUTO: 0 K/UL
BASOPHILS NFR BLD AUTO: 0 %
CD16+CD56+ CELLS # BLD: 141 /UL
CD16+CD56+ CELLS NFR BLD: 8 %
CD19 CELLS NFR BLD: 197 /UL
CD3 CELLS # BLD: 1325 /UL
CD3 CELLS NFR BLD: 79 %
CD3+CD4+ CELLS # BLD: 382 /UL
CD3+CD4+ CELLS NFR BLD: 23 %
CD3+CD4+ CELLS/CD3+CD8+ CLL SPEC: 0.45 RATIO
CD3+CD8+ CELLS # SPEC: 841 /UL
CD3+CD8+ CELLS NFR BLD: 50 %
CELLS.CD3-CD19+/CELLS IN BLOOD: 12 %
EOSINOPHIL # BLD AUTO: 0.1 K/UL
EOSINOPHIL NFR BLD AUTO: 1 %
HCT VFR BLD CALC: 23.9 %
HGB BLD-MCNC: 7.5 G/DL
LYMPHOCYTES # BLD AUTO: 1.69 K/UL
LYMPHOCYTES NFR BLD AUTO: 17 %
MAN DIFF?: NORMAL
MCHC RBC-ENTMCNC: 27.1 PG
MCHC RBC-ENTMCNC: 31.4 G/DL
MCV RBC AUTO: 86.3 FL
MONOCYTES # BLD AUTO: 1.39 K/UL
MONOCYTES NFR BLD AUTO: 14 %
NEUTROPHILS # BLD AUTO: 6.66 K/UL
NEUTROPHILS NFR BLD AUTO: 67 %
PLATELET # BLD AUTO: 229 K/UL
RBC # BLD: 2.77 M/UL
RBC # FLD: 14 %
T PALLIDUM AB SER QL IA: NEGATIVE
WBC # FLD AUTO: 9.94 K/UL

## 2021-08-14 LAB
VIRAL LOAD INTERP: NOT DETECTED COPIES/ML
VIRAL LOAD LOG: NOT DETECTED LOGCOPIES/ML

## 2021-08-17 ENCOUNTER — OUTPATIENT (OUTPATIENT)
Dept: EMERGENCY DEPT | Facility: HOSPITAL | Age: 36
LOS: 1 days | Discharge: HOME | End: 2021-08-17
Payer: MEDICAID

## 2021-08-17 VITALS — DIASTOLIC BLOOD PRESSURE: 65 MMHG | SYSTOLIC BLOOD PRESSURE: 114 MMHG

## 2021-08-17 DIAGNOSIS — R42 DIZZINESS AND GIDDINESS: ICD-10-CM

## 2021-08-17 DIAGNOSIS — O26.893 OTHER SPECIFIED PREGNANCY RELATED CONDITIONS, THIRD TRIMESTER: ICD-10-CM

## 2021-08-17 DIAGNOSIS — Z02.9 ENCOUNTER FOR ADMINISTRATIVE EXAMINATIONS, UNSPECIFIED: ICD-10-CM

## 2021-08-17 PROCEDURE — L9991: CPT

## 2021-08-18 VITALS — HEART RATE: 84 BPM | OXYGEN SATURATION: 82 %

## 2021-08-18 DIAGNOSIS — Z90.79 ACQUIRED ABSENCE OF OTHER GENITAL ORGAN(S): Chronic | ICD-10-CM

## 2021-08-18 LAB
ANISOCYTOSIS BLD QL: SLIGHT — SIGNIFICANT CHANGE UP
BASOPHILS # BLD AUTO: 0 K/UL — SIGNIFICANT CHANGE UP (ref 0–0.2)
BASOPHILS NFR BLD AUTO: 0 % — SIGNIFICANT CHANGE UP (ref 0–1)
BLD GP AB SCN SERPL QL: SIGNIFICANT CHANGE UP
EOSINOPHIL # BLD AUTO: 0 K/UL — SIGNIFICANT CHANGE UP (ref 0–0.7)
EOSINOPHIL NFR BLD AUTO: 0 % — SIGNIFICANT CHANGE UP (ref 0–8)
GIANT PLATELETS BLD QL SMEAR: PRESENT — SIGNIFICANT CHANGE UP
HCT VFR BLD CALC: 26.1 % — LOW (ref 37–47)
HGB BLD-MCNC: 8.3 G/DL — LOW (ref 12–16)
LYMPHOCYTES # BLD AUTO: 1.31 K/UL — SIGNIFICANT CHANGE UP (ref 1.2–3.4)
LYMPHOCYTES # BLD AUTO: 13.3 % — LOW (ref 20.5–51.1)
MANUAL SMEAR VERIFICATION: SIGNIFICANT CHANGE UP
MCHC RBC-ENTMCNC: 26.5 PG — LOW (ref 27–31)
MCHC RBC-ENTMCNC: 31.8 G/DL — LOW (ref 32–37)
MCV RBC AUTO: 83.4 FL — SIGNIFICANT CHANGE UP (ref 81–99)
MICROCYTES BLD QL: SLIGHT — SIGNIFICANT CHANGE UP
MONOCYTES # BLD AUTO: 0.78 K/UL — HIGH (ref 0.1–0.6)
MONOCYTES NFR BLD AUTO: 7.9 % — SIGNIFICANT CHANGE UP (ref 1.7–9.3)
MYELOCYTES NFR BLD: 1.8 % — HIGH (ref 0–0)
NEUTROPHILS # BLD AUTO: 7.57 K/UL — HIGH (ref 1.4–6.5)
NEUTROPHILS NFR BLD AUTO: 76.1 % — HIGH (ref 42.2–75.2)
NEUTS BAND # BLD: 0.9 % — SIGNIFICANT CHANGE UP (ref 0–6)
PLAT MORPH BLD: NORMAL — SIGNIFICANT CHANGE UP
PLATELET # BLD AUTO: 247 K/UL — SIGNIFICANT CHANGE UP (ref 130–400)
POLYCHROMASIA BLD QL SMEAR: SLIGHT — SIGNIFICANT CHANGE UP
RBC # BLD: 3.13 M/UL — LOW (ref 4.2–5.4)
RBC # FLD: 14 % — SIGNIFICANT CHANGE UP (ref 11.5–14.5)
RBC BLD AUTO: ABNORMAL
WBC # BLD: 9.83 K/UL — SIGNIFICANT CHANGE UP (ref 4.8–10.8)
WBC # FLD AUTO: 9.83 K/UL — SIGNIFICANT CHANGE UP (ref 4.8–10.8)

## 2021-08-18 PROCEDURE — 99282 EMERGENCY DEPT VISIT SF MDM: CPT | Mod: 25

## 2021-08-18 PROCEDURE — 76818 FETAL BIOPHYS PROFILE W/NST: CPT | Mod: 26

## 2021-08-18 RX ORDER — IRON SUCROSE 20 MG/ML
200 INJECTION, SOLUTION INTRAVENOUS ONCE
Refills: 0 | Status: DISCONTINUED | OUTPATIENT
Start: 2021-08-18 | End: 2021-08-18

## 2021-08-18 RX ORDER — IRON SUCROSE 20 MG/ML
200 INJECTION, SOLUTION INTRAVENOUS ONCE
Refills: 0 | Status: DISCONTINUED | OUTPATIENT
Start: 2021-08-18 | End: 2021-09-01

## 2021-08-18 RX ORDER — SODIUM CHLORIDE 9 MG/ML
1000 INJECTION, SOLUTION INTRAVENOUS ONCE
Refills: 0 | Status: COMPLETED | OUTPATIENT
Start: 2021-08-18 | End: 2021-08-18

## 2021-08-18 RX ADMIN — SODIUM CHLORIDE 1000 MILLILITER(S): 9 INJECTION, SOLUTION INTRAVENOUS at 00:24

## 2021-08-18 NOTE — OB PROVIDER TRIAGE NOTE - NS_OBGYNHISTORY_OBGYN_ALL_OB_FT
OB Hx:   FT NSVDx1 5lbs  FT NSVDx1 di-di twins 6lbs and 7lbs   ectopic with right salpingectomy    Gyn Hx: h/o HIV and remote h/o chlamydia , denies fibroids, cysts abnormal papsmears

## 2021-08-18 NOTE — OB PROVIDER TRIAGE NOTE - NSOBPROVIDERNOTE_OBGYN_ALL_OB_FT
37yo  at 34w2d GA, GBS unknown, s/p near syncopal episode 2/2 to symptomatic anemia, s/p IV Venofer and IVF hydration, not in  labor, reassuring maternal and fetal status  -Patient states she feels better she no longer has dizziness or lightheadedness  -Patient encouraged to keep Iron transfusion scheduled on Friday  -Patient encouraged to increase PO intake and PO hydration  - labor precautions, [facility] advised     Dr. Jerome and Dr. Vasquez aware

## 2021-08-18 NOTE — OB PROVIDER TRIAGE NOTE - NSHPLABSRESULTS_GEN_ALL_CORE
Labs:   3/26/21  HbsAg NR  rubella immune  measles immune  varicella immune  rpr NR  mump immune  Bpos, antibody neg  CF pos carrier    8/18  B pos, antibody neg    8/11  HIV-1 RNA viral load not detected    sonogram:  33w1d: vtx, anterior placenta, MVP:  6.27cm, BPP: 8/8, EFW: 2176 (48%), bilateral polydactyly  @20w4d: vtx, anterior placenta, MVP: 6.46cm, EFW: 355gms, 2 unilateral choroid plexus cyst 2-2.25cm.   @9w3d: CRL: 26.81mm,

## 2021-08-18 NOTE — OB PROVIDER TRIAGE NOTE - HISTORY OF PRESENT ILLNESS
37yo  at 34w2d GA, EDC 21 by 2nd trimester sonogram presenting to L&D s/p near syncopal episode. Patient states that around 7pm on  she felt dizzy and lightheaded and was about to faint when she grabbed a hold of a side rail to stablize herself. She states that she had another episode an hour later when she got home prompting her to come to the hospital. Patient has a history of anemia was scheduled for IV iron infusion starting this friday. Patient also endorsing nausea associated with decreased PO intake. Patient states she only eats 1-2 meals a day due to the nausea and hasn't been drinking any water. Patient endorsing contractions every 10 minutes, 7/10 in intensity. Denies LOF or VB. Good fetal movement. Denies headaches, vision changes, chest pain, fevers, chills, LE pain or swelling, dysuria. Pregnancy complicated by HIV patient currently on antiretroviral therapy, with recent undetectable viral load. No other complications this pregnancy. GBS unknown    Last meal: 4 hrs ago  Last BM: in the AM  Last sex: 1 month ago

## 2021-08-18 NOTE — OB PROVIDER TRIAGE NOTE - NSHPPHYSICALEXAM_GEN_ALL_CORE
T(C): 36.7 (08-17-21 @ 23:05), Max: 36.7 (08-17-21 @ 23:05)  HR: 84 (08-18-21 @ 01:30) (44 - 102)  BP: 114/65 (08-17-21 @ 23:05) (114/65 - 114/65)  RR: 14 (08-17-21 @ 23:05) (14 - 14)  SpO2: 82% (08-18-21 @ 01:30) (82% - 100%)    Gen: A+OX3. NAD  Heart: RRR, M/R/G  Lungs: CTAB  Abd: Soft, Nontender. Gravid. no palpable contractions  LE: no erythema, edema or pain    FHR: 135bpm/moderate variability/+accelerations/no decelerations  TOCO: none  SVE: 0/0/-3 vtx intact  BSS: cephalic, anterior placenta, MVP: 4.10cm, BPP: 10/10    EFW by Leopolds: 3000gms

## 2021-08-20 ENCOUNTER — APPOINTMENT (OUTPATIENT)
Dept: HEMATOLOGY ONCOLOGY | Facility: CLINIC | Age: 36
End: 2021-08-20
Payer: MEDICAID

## 2021-08-20 VITALS
HEART RATE: 99 BPM | RESPIRATION RATE: 14 BRPM | SYSTOLIC BLOOD PRESSURE: 115 MMHG | TEMPERATURE: 98.5 F | DIASTOLIC BLOOD PRESSURE: 65 MMHG

## 2021-08-20 DIAGNOSIS — Z78.9 OTHER SPECIFIED HEALTH STATUS: ICD-10-CM

## 2021-08-20 PROCEDURE — 99204 OFFICE O/P NEW MOD 45 MIN: CPT

## 2021-08-20 RX ORDER — PRENATAL VIT 49/IRON FUM/FOLIC 6.75-0.2MG
TABLET ORAL
Refills: 0 | Status: ACTIVE | COMMUNITY

## 2021-08-20 NOTE — REASON FOR VISIT
[Initial Consultation] : an initial consultation for [FreeTextEntry2] : Iron deficiency anemia of pregnancy

## 2021-08-20 NOTE — ASSESSMENT
[FreeTextEntry1] : Anemia of pregnancy secondary to iron deficiency. \par The situation was discussed with the patient. \par Since most of her blood work was recent, although no iron studies performed, will proceed with IV Feraheme 510 mg x 2 hopefully in 1 week with a 3-day interval.\par \par She will follow up with her obstetrician and PMD and referred back to us as needed.\par \par All her questions answered.

## 2021-08-20 NOTE — REVIEW OF SYSTEMS
[Fatigue] : fatigue [Recent Change In Weight] : ~T recent weight change [SOB on Exertion] : shortness of breath during exertion [Dizziness] : dizziness [Fainting] : fainting [Insomnia] : insomnia [Negative] : Heme/Lymph

## 2021-08-20 NOTE — HISTORY OF PRESENT ILLNESS
[Disease:__________________________] : Disease: [unfilled] [de-identified] : The patient is a 36 year old female referred by Dr. BRIAN Lott fpr evaluation of anemia of iron deficiency in a 34-week pregnant woman, for the specific reason of IV iron administration.\par This is the patient's 4th pregnancy. She never had iron treatments before\par Over the last several weeks, she has experienced a gradually worsening, fatigue, shortness of breath, lack of energy. On one occasion she also felt dizzy and almost fainted. Seen in the ER, she was hydrated and infused Venofer x 1 with recommendation to see us and continue iron treatments as outpatient.\par The patient feels may be slightly alpa.\par Review of her records show that her Hgb was in the mid-7's.

## 2021-08-20 NOTE — PHYSICAL EXAM
[Restricted in physically strenuous activity but ambulatory and able to carry out work of a light or sedentary nature] : Status 1- Restricted in physically strenuous activity but ambulatory and able to carry out work of a light or sedentary nature, e.g., light house work, office work [Normal] : grossly intact [de-identified] : But obviously pregnant consistent with a 34-week pregnancy. [de-identified] : Mild conjunctival pallor. [de-identified] : RR, grade II/VI systolic murmur [de-identified] : Distended from pregnancy [de-identified] : Anxious

## 2021-08-23 ENCOUNTER — APPOINTMENT (OUTPATIENT)
Dept: INFUSION THERAPY | Facility: CLINIC | Age: 36
End: 2021-08-23

## 2021-08-25 ENCOUNTER — RESULT CHARGE (OUTPATIENT)
Age: 36
End: 2021-08-25

## 2021-08-25 ENCOUNTER — APPOINTMENT (OUTPATIENT)
Dept: INFUSION THERAPY | Facility: CLINIC | Age: 36
End: 2021-08-25

## 2021-08-25 ENCOUNTER — OUTPATIENT (OUTPATIENT)
Dept: OUTPATIENT SERVICES | Facility: HOSPITAL | Age: 36
LOS: 1 days | Discharge: HOME | End: 2021-08-25

## 2021-08-25 ENCOUNTER — APPOINTMENT (OUTPATIENT)
Dept: ANTEPARTUM | Facility: CLINIC | Age: 36
End: 2021-08-25
Payer: MEDICAID

## 2021-08-25 VITALS
DIASTOLIC BLOOD PRESSURE: 56 MMHG | BODY MASS INDEX: 29.41 KG/M2 | OXYGEN SATURATION: 98 % | WEIGHT: 183 LBS | HEART RATE: 100 BPM | HEIGHT: 66 IN | SYSTOLIC BLOOD PRESSURE: 125 MMHG

## 2021-08-25 DIAGNOSIS — Z90.79 ACQUIRED ABSENCE OF OTHER GENITAL ORGAN(S): Chronic | ICD-10-CM

## 2021-08-25 LAB
BILIRUB UR QL STRIP: 1
CLARITY UR: CLEAR
COLLECTION METHOD: NORMAL
FETAL HEART RATE (BPM): 155
FETAL MOVEMENT: PRESENT
GLUCOSE UR-MCNC: NORMAL
HCG UR QL: 0.2 EU/DL
HGB UR QL STRIP.AUTO: NORMAL
KETONES UR-MCNC: NORMAL
LEUKOCYTE ESTERASE UR QL STRIP: NORMAL
NITRITE UR QL STRIP: NORMAL
OB COMMENTS: NORMAL
PH UR STRIP: 6
PROT UR STRIP-MCNC: NORMAL
SP GR UR STRIP: 1.01
URINE ALBUMIN/PROTEIN: NORMAL
URINE GLUCOSE: NORMAL
URINE KETONES: NORMAL
WEEKS GESTATION: NORMAL

## 2021-08-25 PROCEDURE — 99214 OFFICE O/P EST MOD 30 MIN: CPT

## 2021-08-25 RX ORDER — FERUMOXYTOL 510 MG/17ML
510 INJECTION INTRAVENOUS ONCE
Refills: 0 | Status: COMPLETED | OUTPATIENT
Start: 2021-08-25 | End: 2021-08-25

## 2021-08-25 RX ADMIN — FERUMOXYTOL 156 MILLIGRAM(S): 510 INJECTION INTRAVENOUS at 14:25

## 2021-08-26 NOTE — ED ADULT NURSE NOTE - NS ED NOTE  TALK SOMEONE YN
Problem: Humidified High Flow Nasal Cannula  Goal: Maintain adequate oxygenation dependent on patient condition  Description: 1.  Implement humidified high flow oxygen therapy  2.  Titrate high flow oxygen to maintain appropriate SpO2  Outcome: Progressing   Patient remained on oxymask with no signs of respiratory distress noted.   No

## 2021-08-27 DIAGNOSIS — O99.013 ANEMIA COMPLICATING PREGNANCY, THIRD TRIMESTER: ICD-10-CM

## 2021-08-27 DIAGNOSIS — O98.719 HUMAN IMMUNODEFICIENCY VIRUS [HIV] DISEASE COMPLICATING PREGNANCY, UNSPECIFIED TRIMESTER: ICD-10-CM

## 2021-08-27 DIAGNOSIS — Z3A.35 35 WEEKS GESTATION OF PREGNANCY: ICD-10-CM

## 2021-08-30 ENCOUNTER — OUTPATIENT (OUTPATIENT)
Dept: OUTPATIENT SERVICES | Facility: HOSPITAL | Age: 36
LOS: 1 days | Discharge: HOME | End: 2021-08-30

## 2021-08-30 ENCOUNTER — APPOINTMENT (OUTPATIENT)
Dept: INFUSION THERAPY | Facility: CLINIC | Age: 36
End: 2021-08-30

## 2021-08-30 DIAGNOSIS — Z90.79 ACQUIRED ABSENCE OF OTHER GENITAL ORGAN(S): Chronic | ICD-10-CM

## 2021-08-30 DIAGNOSIS — O99.019 ANEMIA COMPLICATING PREGNANCY, UNSPECIFIED TRIMESTER: ICD-10-CM

## 2021-08-30 RX ORDER — FERUMOXYTOL 510 MG/17ML
510 INJECTION INTRAVENOUS ONCE
Refills: 0 | Status: COMPLETED | OUTPATIENT
Start: 2021-08-30 | End: 2021-08-30

## 2021-08-30 RX ADMIN — FERUMOXYTOL 156 MILLIGRAM(S): 510 INJECTION INTRAVENOUS at 14:56

## 2021-08-30 RX ADMIN — FERUMOXYTOL 510 MILLIGRAM(S): 510 INJECTION INTRAVENOUS at 15:26

## 2021-08-31 ENCOUNTER — NON-APPOINTMENT (OUTPATIENT)
Age: 36
End: 2021-08-31

## 2021-09-01 ENCOUNTER — RESULT CHARGE (OUTPATIENT)
Age: 36
End: 2021-09-01

## 2021-09-01 ENCOUNTER — APPOINTMENT (OUTPATIENT)
Dept: ANTEPARTUM | Facility: CLINIC | Age: 36
End: 2021-09-01
Payer: MEDICAID

## 2021-09-01 ENCOUNTER — OUTPATIENT (OUTPATIENT)
Dept: OUTPATIENT SERVICES | Facility: HOSPITAL | Age: 36
LOS: 1 days | Discharge: HOME | End: 2021-09-01

## 2021-09-01 VITALS
DIASTOLIC BLOOD PRESSURE: 56 MMHG | BODY MASS INDEX: 30.22 KG/M2 | HEIGHT: 66 IN | OXYGEN SATURATION: 99 % | SYSTOLIC BLOOD PRESSURE: 117 MMHG | HEART RATE: 93 BPM | TEMPERATURE: 98.3 F | WEIGHT: 188 LBS

## 2021-09-01 DIAGNOSIS — O98.713 HUMAN IMMUNODEFICIENCY VIRUS [HIV] DISEASE COMPLICATING PREGNANCY, THIRD TRIMESTER: ICD-10-CM

## 2021-09-01 DIAGNOSIS — Z3A.36 36 WEEKS GESTATION OF PREGNANCY: ICD-10-CM

## 2021-09-01 DIAGNOSIS — Z90.79 ACQUIRED ABSENCE OF OTHER GENITAL ORGAN(S): Chronic | ICD-10-CM

## 2021-09-01 DIAGNOSIS — O99.019 ANEMIA COMPLICATING PREGNANCY, UNSPECIFIED TRIMESTER: ICD-10-CM

## 2021-09-01 LAB
FETAL HEART RATE (BPM): 137
FETAL MOVEMENT: PRESENT
OB COMMENTS: NORMAL
URINE ALBUMIN/PROTEIN: NORMAL
URINE GLUCOSE: NORMAL
URINE KETONES: NORMAL
WEEKS GESTATION: NORMAL

## 2021-09-01 PROCEDURE — 99215 OFFICE O/P EST HI 40 MIN: CPT

## 2021-09-02 LAB
BILIRUB UR QL STRIP: NORMAL
CLARITY UR: CLEAR
COLLECTION METHOD: NORMAL
GLUCOSE UR-MCNC: NORMAL
HCG UR QL: 0.2 EU/DL
HGB UR QL STRIP.AUTO: NORMAL
KETONES UR-MCNC: NORMAL
LEUKOCYTE ESTERASE UR QL STRIP: NORMAL
NITRITE UR QL STRIP: NORMAL
PH UR STRIP: 7
PROT UR STRIP-MCNC: NORMAL
SP GR UR STRIP: 1.01

## 2021-09-03 LAB
GP B STREP DNA SPEC QL NAA+PROBE: NORMAL
GP B STREP DNA SPEC QL NAA+PROBE: NOT DETECTED
SOURCE GBS: NORMAL

## 2021-09-07 ENCOUNTER — NON-APPOINTMENT (OUTPATIENT)
Age: 36
End: 2021-09-07

## 2021-09-07 ENCOUNTER — OUTPATIENT (OUTPATIENT)
Dept: OUTPATIENT SERVICES | Facility: HOSPITAL | Age: 36
LOS: 1 days | Discharge: HOME | End: 2021-09-07
Payer: MEDICAID

## 2021-09-07 VITALS
HEART RATE: 80 BPM | SYSTOLIC BLOOD PRESSURE: 121 MMHG | TEMPERATURE: 98 F | DIASTOLIC BLOOD PRESSURE: 70 MMHG | RESPIRATION RATE: 18 BRPM

## 2021-09-07 VITALS — DIASTOLIC BLOOD PRESSURE: 63 MMHG | SYSTOLIC BLOOD PRESSURE: 119 MMHG | HEART RATE: 75 BPM

## 2021-09-07 DIAGNOSIS — Z90.79 ACQUIRED ABSENCE OF OTHER GENITAL ORGAN(S): Chronic | ICD-10-CM

## 2021-09-07 PROCEDURE — 76815 OB US LIMITED FETUS(S): CPT | Mod: 26

## 2021-09-07 PROCEDURE — 99214 OFFICE O/P EST MOD 30 MIN: CPT

## 2021-09-07 NOTE — OB PROVIDER TRIAGE NOTE - HISTORY OF PRESENT ILLNESS
35y/o,  at 37w1d, dated by first trimester sonogram with LINDSAY 2021 , presents for contractions    Denies vaginal bleeding and LOF. Feels good fetal movements. Last examined on and was found to be cm dilated. No complications this pregnancy.     37y/o,  at 37w1d, dated by first trimester sonogram with LINDSAY 2021 , presents for leakage of clear fluid since 9pm this afternoon. Also reports contractions immediately following, every 15 min and 7/10 intensity.   Denies vaginal bleeding. Feels good fetal movements. Last examined last week and was not dilated. This pregnancy was complicated by anemia, last iron infusion was last week. She also has a history of HIV, currently taking ____. Last viral load was aug 14 and was not detected. No other complications this pregnancy.     35y/o,  at 37w1d, dated by first trimester sonogram with LINDSAY 2021 , presents for leakage of clear fluid since 9pm this afternoon. Also reports contractions immediately following, every 15 min and 7/10 intensity.   Denies vaginal bleeding. Feels good fetal movements. Last examined last week and was not dilated. This pregnancy was complicated by anemia, last iron infusion was last week. She also has a history of HIV, currently taking Descovy 200mg-25mg 1tab qd, Genvoya [id number]-615et-699xf-10mg 1tab qd, Tivicay 50mg 1tab qd. Last viral load was aug 14 and was not detected. No other complications this pregnancy.     37y/o,  at 37w1d, dated by first trimester sonogram with LINDSAY 2021 , presents for leakage of clear fluid since 45min before presentation to l&D, not requiring any pad or underwear changes. Also reports contractions immediately following, every 15 min and 7/10 intensity.   Denies vaginal bleeding. Feels good fetal movements. Last examined last week and was not dilated. This pregnancy was complicated by anemia, last iron infusion was last week. She also has a history of HIV, currently taking Descovy 200mg-25mg 1tab qd, Genvoya 916xz-[id number]-745ts-10mg 1tab qd, Tivicay 50mg 1tab qd. Last viral load was aug 14 and was not detected. No other complications this pregnancy. GBS unknown     35y/o,  at 37w1d, dated by first trimester sonogram with LINDSAY 2021 , presents for leakage of clear fluid since 45min before presentation to l&D, not requiring any pad or underwear changes. Also reports contractions immediately following, every 15 min and 7/10 intensity.   Denies vaginal bleeding. Feels good fetal movements. Last examined last week and was not dilated. This pregnancy was complicated by anemia, last iron infusion was last week. She also has a history of HIV, currently taking Descovy 200mg-25mg 1tab qd, Genvoya 429cm-172ws-684cb-10mg 1tab qd, Tivicay 50mg 1tab qd. Last viral load was aug 14 and was not detected. No other complications this pregnancy. GBS negative.

## 2021-09-07 NOTE — OB PROVIDER TRIAGE NOTE - NS_OBGYNHISTORY_OBGYN_ALL_OB_FT
Obhx:   G1: , FT, 5lbs, no complications  G2: , twins, FT, 6lbs and 7lbs, no complications    Gyn: reports hx of cysts and fibroids, conservative management. No abnormal paps. Hx of HIV, no other STDs.

## 2021-09-07 NOTE — OB PROVIDER TRIAGE NOTE - NSHPLABSRESULTS_GEN_ALL_CORE
GBS neg (sept 1, 2021)  HIV viral load: not detected (Aug 11, 2021)    RPR: neg  GC/Chlamydia: not detected  Hep B: immune  Rubella: Immune  Measles: immune  Varicella: Immune    Sono:  20w4d: bilateral polydactyly and bilateral choroid plexus cysts, anterior placenta, vertex, MVP 6.46  33w1d: bilateral polydactyly and bilateral choroid plexus cysts, anterior placenta, vertex, MVP 6.27, BPP 8/8, EFW: [id number] (48%)

## 2021-09-07 NOTE — OB PROVIDER TRIAGE NOTE - NSHPPHYSICALEXAM_GEN_ALL_CORE
Vital Signs Last 24 Hrs  T(C): 36.8 (07 Sep 2021 22:48), Max: 36.8 (07 Sep 2021 22:48)  T(F): 98.2 (07 Sep 2021 22:48), Max: 98.2 (07 Sep 2021 22:48)  HR: 75 (07 Sep 2021 23:51) (75 - 80)  BP: 119/63 (07 Sep 2021 23:51) (119/63 - 121/70)  RR: 18 (07 Sep 2021 22:48) (18 - 18)    Gen: AOx3, no acute distress  Abd: soft, gravid, non tender, no palpable contractions  Ext: No edema bilaterally    EFM:  138/mod/+accels; cat 1  [place]: not warren  SVE: 0/0/-3   vertex intact @ 11:30am

## 2021-09-07 NOTE — OB PROVIDER TRIAGE NOTE - NSOBPROVIDERNOTE_OBGYN_ALL_OB_FT
36y GP @ weeks, GBS , not in labor.  -Discharged Home  -Labor precautions reviewed  -PO Hydration encouraged  -Reviewed fetal kick counts  -Follow up with scheduled OB visit  -Patient verbalized understanding 36y  @ 37w1d, GBS neg, c/w anemia, last infusion last week and HIV, last viral load not detected, not rupture or in labor.    -Discharged Home  -Labor precautions reviewed  -PO Hydration encouraged  -Reviewed fetal kick counts  -Follow up with scheduled OB visit tomorrow (2021)  -Patient verbalized understanding    Dr. Johnson and Dr. Castillo aware. 36y  @ 37w1d, GBS neg, c/w anemia, last infusion last week and HIV, last viral load not detected, not ruptured or in labor.    -Discharged Home  -Labor precautions reviewed  -PO Hydration encouraged  -Reviewed fetal kick counts  -Follow up with scheduled OB visit tomorrow (2021)  -Patient verbalized understanding    Dr. Johnson and Dr. Castillo aware. 36y  @ 37w1d, GBS neg, c/w anemia, last infusion last week and HIV, last viral load not detected, not ruptured or in labor.    -Discharged Home  -Labor precautions reviewed  -PO Hydration encouraged  -Reviewed fetal kick counts  -Follow up with scheduled OB visit tomorrow (2021)  -Patient verbalized understanding    Dr. Johnson and Dr. Castillo aware.    Attending note  Pt seen and examined  discussed w/ resident  37 weeks not in labor  EFM reassuring  continue prenatal care, precautions given

## 2021-09-07 NOTE — OB PROVIDER TRIAGE NOTE - ADDITIONAL INSTRUCTIONS
please follow-up with your doctor tomorrow at your next appointment or return to labor and delivery as needed, if you don't feel the baby move, if you think you broke your water, have vaginal bleeding or are warren, please come to Labor and Delivery. Diagnostic testing not indicated for today's encounter

## 2021-09-08 ENCOUNTER — OUTPATIENT (OUTPATIENT)
Dept: OUTPATIENT SERVICES | Facility: HOSPITAL | Age: 36
LOS: 1 days | Discharge: HOME | End: 2021-09-08

## 2021-09-08 ENCOUNTER — APPOINTMENT (OUTPATIENT)
Dept: ANTEPARTUM | Facility: CLINIC | Age: 36
End: 2021-09-08
Payer: MEDICAID

## 2021-09-08 ENCOUNTER — ASOB RESULT (OUTPATIENT)
Age: 36
End: 2021-09-08

## 2021-09-08 ENCOUNTER — LABORATORY RESULT (OUTPATIENT)
Age: 36
End: 2021-09-08

## 2021-09-08 VITALS
BODY MASS INDEX: 29.7 KG/M2 | OXYGEN SATURATION: 98 % | WEIGHT: 184 LBS | TEMPERATURE: 98.7 F | DIASTOLIC BLOOD PRESSURE: 70 MMHG | SYSTOLIC BLOOD PRESSURE: 115 MMHG | HEART RATE: 87 BPM

## 2021-09-08 DIAGNOSIS — R74.8 ABNORMAL LEVELS OF OTHER SERUM ENZYMES: ICD-10-CM

## 2021-09-08 DIAGNOSIS — Z90.79 ACQUIRED ABSENCE OF OTHER GENITAL ORGAN(S): Chronic | ICD-10-CM

## 2021-09-08 DIAGNOSIS — O28.3 ABNORMAL ULTRASONIC FINDING ON ANTENATAL SCREENING OF MOTHER: ICD-10-CM

## 2021-09-08 DIAGNOSIS — D64.9 ANEMIA, UNSPECIFIED: ICD-10-CM

## 2021-09-08 DIAGNOSIS — Z3A.33 33 WEEKS GESTATION OF PREGNANCY: ICD-10-CM

## 2021-09-08 LAB
BILIRUB UR QL STRIP: NEGATIVE
CLARITY UR: CLEAR
COLLECTION METHOD: NORMAL
FETAL HEART DESCRIPTION: NORMAL
FETAL HEART RATE (BPM): 151
GLUCOSE UR-MCNC: NEGATIVE
HCG UR QL: 0.2 EU/DL
HGB UR QL STRIP.AUTO: NEGATIVE
KETONES UR-MCNC: NORMAL
LEUKOCYTE ESTERASE UR QL STRIP: NEGATIVE
NITRITE UR QL STRIP: NORMAL
OB COMMENTS: NORMAL
PH UR STRIP: 6.5
PROT UR STRIP-MCNC: NEGATIVE
SCHEDULED VISIT: YES
SP GR UR STRIP: 1.02
URINE ALBUMIN/PROTEIN: NEGATIVE
URINE GLUCOSE: NEGATIVE
URINE KETONES: NEGATIVE
WEEKS GESTATION: 37.3

## 2021-09-08 PROCEDURE — 76816 OB US FOLLOW-UP PER FETUS: CPT | Mod: 26

## 2021-09-08 PROCEDURE — 99214 OFFICE O/P EST MOD 30 MIN: CPT | Mod: 25

## 2021-09-08 PROCEDURE — 76819 FETAL BIOPHYS PROFIL W/O NST: CPT | Mod: 26

## 2021-09-09 LAB
BASOPHILS # BLD AUTO: 0.07 K/UL
BASOPHILS NFR BLD AUTO: 1 %
EOSINOPHIL # BLD AUTO: 0.07 K/UL
EOSINOPHIL NFR BLD AUTO: 1 %
HCT VFR BLD CALC: 29.8 %
HGB BLD-MCNC: 9.4 G/DL
LYMPHOCYTES # BLD AUTO: 1.21 K/UL
LYMPHOCYTES NFR BLD AUTO: 17 %
MAN DIFF?: NORMAL
MCHC RBC-ENTMCNC: 28.8 PG
MCHC RBC-ENTMCNC: 31.5 G/DL
MCV RBC AUTO: 91.4 FL
MONOCYTES # BLD AUTO: 0.43 K/UL
MONOCYTES NFR BLD AUTO: 6 %
NEUTROPHILS # BLD AUTO: 5.25 K/UL
NEUTROPHILS NFR BLD AUTO: 69 %
PLATELET # BLD AUTO: 184 K/UL
RBC # BLD: 3.26 M/UL
RBC # BLD: 3.26 M/UL
RBC # FLD: 20.9 %
RETICS # AUTO: 4.6 %
RETICS AGGREG/RBC NFR: 148.7 K/UL
WBC # FLD AUTO: 7.1 K/UL

## 2021-09-10 LAB
HIV1 RNA # SERPL NAA+PROBE: <20 COPIES/ML
T PALLIDUM AB SER QL IA: NEGATIVE
VIRAL LOAD INTERP: DETECTED COPIES/ML
VIRAL LOAD LOG: <1.3 LOGCOPIES/ML

## 2021-09-15 ENCOUNTER — NON-APPOINTMENT (OUTPATIENT)
Age: 36
End: 2021-09-15

## 2021-09-15 ENCOUNTER — OUTPATIENT (OUTPATIENT)
Dept: OUTPATIENT SERVICES | Facility: HOSPITAL | Age: 36
LOS: 1 days | Discharge: HOME | End: 2021-09-15

## 2021-09-15 ENCOUNTER — APPOINTMENT (OUTPATIENT)
Dept: ANTEPARTUM | Facility: CLINIC | Age: 36
End: 2021-09-15
Payer: MEDICAID

## 2021-09-15 ENCOUNTER — RESULT CHARGE (OUTPATIENT)
Age: 36
End: 2021-09-15

## 2021-09-15 VITALS
SYSTOLIC BLOOD PRESSURE: 114 MMHG | DIASTOLIC BLOOD PRESSURE: 55 MMHG | WEIGHT: 187 LBS | HEART RATE: 82 BPM | BODY MASS INDEX: 30.05 KG/M2 | OXYGEN SATURATION: 99 % | HEIGHT: 66 IN

## 2021-09-15 DIAGNOSIS — Z30.09 ENCOUNTER FOR OTHER GENERAL COUNSELING AND ADVICE ON CONTRACEPTION: ICD-10-CM

## 2021-09-15 DIAGNOSIS — D64.9 ANEMIA, UNSPECIFIED: ICD-10-CM

## 2021-09-15 DIAGNOSIS — Z3A.37 37 WEEKS GESTATION OF PREGNANCY: ICD-10-CM

## 2021-09-15 DIAGNOSIS — O98.719 HUMAN IMMUNODEFICIENCY VIRUS [HIV] DISEASE COMPLICATING PREGNANCY, UNSPECIFIED TRIMESTER: ICD-10-CM

## 2021-09-15 DIAGNOSIS — O34.219 MATERNAL CARE FOR UNSPECIFIED TYPE SCAR FROM PREVIOUS CESAREAN DELIVERY: ICD-10-CM

## 2021-09-15 DIAGNOSIS — R74.8 ABNORMAL LEVELS OF OTHER SERUM ENZYMES: ICD-10-CM

## 2021-09-15 DIAGNOSIS — O28.3 ABNORMAL ULTRASONIC FINDING ON ANTENATAL SCREENING OF MOTHER: ICD-10-CM

## 2021-09-15 DIAGNOSIS — Z3A.38 38 WEEKS GESTATION OF PREGNANCY: ICD-10-CM

## 2021-09-15 DIAGNOSIS — Z98.891 HISTORY OF UTERINE SCAR FROM PREVIOUS SURGERY: ICD-10-CM

## 2021-09-15 DIAGNOSIS — Z90.79 ACQUIRED ABSENCE OF OTHER GENITAL ORGAN(S): Chronic | ICD-10-CM

## 2021-09-15 DIAGNOSIS — Z3A.33 33 WEEKS GESTATION OF PREGNANCY: ICD-10-CM

## 2021-09-15 PROBLEM — B20 HUMAN IMMUNODEFICIENCY VIRUS [HIV] DISEASE: Chronic | Status: ACTIVE | Noted: 2021-09-07

## 2021-09-15 LAB
BILIRUB UR QL STRIP: NORMAL
CLARITY UR: CLEAR
COLLECTION METHOD: NORMAL
FETAL HEART RATE (BPM): 144
FETAL MOVEMENT: PRESENT
GLUCOSE UR-MCNC: NORMAL
HCG UR QL: 0.2 EU/DL
HGB UR QL STRIP.AUTO: NORMAL
KETONES UR-MCNC: NORMAL
LEUKOCYTE ESTERASE UR QL STRIP: NORMAL
NITRITE UR QL STRIP: NORMAL
OB COMMENTS: NORMAL
PH UR STRIP: 6
PROT UR STRIP-MCNC: NORMAL
SP GR UR STRIP: 1.01
URINE ALBUMIN/PROTEIN: NORMAL
URINE GLUCOSE: NORMAL
URINE KETONES: NORMAL
WEEKS GESTATION: NORMAL

## 2021-09-15 PROCEDURE — 99214 OFFICE O/P EST MOD 30 MIN: CPT

## 2021-09-15 RX ORDER — DOCUSATE SODIUM 100 MG/1
100 CAPSULE ORAL TWICE DAILY
Qty: 60 | Refills: 2 | Status: ACTIVE | COMMUNITY
Start: 2021-09-15 | End: 1900-01-01

## 2021-09-15 RX ORDER — CHLORHEXIDINE GLUCONATE 4 %
325 (65 FE) LIQUID (ML) TOPICAL TWICE DAILY
Qty: 60 | Refills: 1 | Status: ACTIVE | COMMUNITY
Start: 2021-09-15 | End: 1900-01-01

## 2021-09-19 ENCOUNTER — OUTPATIENT (OUTPATIENT)
Dept: OUTPATIENT SERVICES | Facility: HOSPITAL | Age: 36
LOS: 1 days | Discharge: HOME | End: 2021-09-19

## 2021-09-19 ENCOUNTER — LABORATORY RESULT (OUTPATIENT)
Age: 36
End: 2021-09-19

## 2021-09-19 DIAGNOSIS — Z11.59 ENCOUNTER FOR SCREENING FOR OTHER VIRAL DISEASES: ICD-10-CM

## 2021-09-19 DIAGNOSIS — Z90.79 ACQUIRED ABSENCE OF OTHER GENITAL ORGAN(S): Chronic | ICD-10-CM

## 2021-09-20 ENCOUNTER — NON-APPOINTMENT (OUTPATIENT)
Age: 36
End: 2021-09-20

## 2021-09-22 ENCOUNTER — APPOINTMENT (OUTPATIENT)
Dept: ANTEPARTUM | Facility: CLINIC | Age: 36
End: 2021-09-22

## 2021-09-22 ENCOUNTER — INPATIENT (INPATIENT)
Facility: HOSPITAL | Age: 36
LOS: 2 days | Discharge: HOME | End: 2021-09-25
Attending: OBSTETRICS & GYNECOLOGY | Admitting: OBSTETRICS & GYNECOLOGY
Payer: MEDICAID

## 2021-09-22 ENCOUNTER — NON-APPOINTMENT (OUTPATIENT)
Age: 36
End: 2021-09-22

## 2021-09-22 VITALS — HEIGHT: 66 IN | RESPIRATION RATE: 18 BRPM | WEIGHT: 188.05 LBS | TEMPERATURE: 98 F

## 2021-09-22 DIAGNOSIS — Z90.79 ACQUIRED ABSENCE OF OTHER GENITAL ORGAN(S): Chronic | ICD-10-CM

## 2021-09-22 LAB
ANISOCYTOSIS BLD QL: SLIGHT — SIGNIFICANT CHANGE UP
APPEARANCE UR: CLEAR — SIGNIFICANT CHANGE UP
BACTERIA # UR AUTO: ABNORMAL
BASOPHILS # BLD AUTO: 0 K/UL — SIGNIFICANT CHANGE UP (ref 0–0.2)
BASOPHILS NFR BLD AUTO: 0 % — SIGNIFICANT CHANGE UP (ref 0–1)
BILIRUB UR-MCNC: NEGATIVE — SIGNIFICANT CHANGE UP
BLD GP AB SCN SERPL QL: SIGNIFICANT CHANGE UP
COLOR SPEC: SIGNIFICANT CHANGE UP
DIFF PNL FLD: ABNORMAL
EOSINOPHIL # BLD AUTO: 0.07 K/UL — SIGNIFICANT CHANGE UP (ref 0–0.7)
EOSINOPHIL NFR BLD AUTO: 0.9 % — SIGNIFICANT CHANGE UP (ref 0–8)
EPI CELLS # UR: 4 /HPF — SIGNIFICANT CHANGE UP (ref 0–5)
GLUCOSE UR QL: NEGATIVE — SIGNIFICANT CHANGE UP
HCT VFR BLD CALC: 31.7 % — LOW (ref 37–47)
HGB BLD-MCNC: 10.1 G/DL — LOW (ref 12–16)
HYALINE CASTS # UR AUTO: 5 /LPF — SIGNIFICANT CHANGE UP (ref 0–7)
KETONES UR-MCNC: NEGATIVE — SIGNIFICANT CHANGE UP
LEUKOCYTE ESTERASE UR-ACNC: ABNORMAL
LYMPHOCYTES # BLD AUTO: 1.99 K/UL — SIGNIFICANT CHANGE UP (ref 1.2–3.4)
LYMPHOCYTES # BLD AUTO: 27 % — SIGNIFICANT CHANGE UP (ref 20.5–51.1)
MANUAL SMEAR VERIFICATION: SIGNIFICANT CHANGE UP
MCHC RBC-ENTMCNC: 28.9 PG — SIGNIFICANT CHANGE UP (ref 27–31)
MCHC RBC-ENTMCNC: 31.9 G/DL — LOW (ref 32–37)
MCV RBC AUTO: 90.6 FL — SIGNIFICANT CHANGE UP (ref 81–99)
METAMYELOCYTES # FLD: 1.7 % — HIGH (ref 0–0)
MONOCYTES # BLD AUTO: 0.38 K/UL — SIGNIFICANT CHANGE UP (ref 0.1–0.6)
MONOCYTES NFR BLD AUTO: 5.2 % — SIGNIFICANT CHANGE UP (ref 1.7–9.3)
NEUTROPHILS # BLD AUTO: 4.8 K/UL — SIGNIFICANT CHANGE UP (ref 1.4–6.5)
NEUTROPHILS NFR BLD AUTO: 64.3 % — SIGNIFICANT CHANGE UP (ref 42.2–75.2)
NEUTS BAND # BLD: 0.9 % — SIGNIFICANT CHANGE UP (ref 0–6)
NITRITE UR-MCNC: NEGATIVE — SIGNIFICANT CHANGE UP
PH UR: 6.5 — SIGNIFICANT CHANGE UP (ref 5–8)
PLAT MORPH BLD: NORMAL — SIGNIFICANT CHANGE UP
PLATELET # BLD AUTO: 178 K/UL — SIGNIFICANT CHANGE UP (ref 130–400)
POLYCHROMASIA BLD QL SMEAR: SLIGHT — SIGNIFICANT CHANGE UP
PRENATAL SYPHILIS TEST: SIGNIFICANT CHANGE UP
PROT UR-MCNC: SIGNIFICANT CHANGE UP
RBC # BLD: 3.5 M/UL — LOW (ref 4.2–5.4)
RBC # FLD: 19 % — HIGH (ref 11.5–14.5)
RBC BLD AUTO: ABNORMAL
RBC CASTS # UR COMP ASSIST: 42 /HPF — HIGH (ref 0–4)
SP GR SPEC: 1.02 — SIGNIFICANT CHANGE UP (ref 1.01–1.03)
UROBILINOGEN FLD QL: ABNORMAL
WBC # BLD: 7.36 K/UL — SIGNIFICANT CHANGE UP (ref 4.8–10.8)
WBC # FLD AUTO: 7.36 K/UL — SIGNIFICANT CHANGE UP (ref 4.8–10.8)
WBC UR QL: 7 /HPF — HIGH (ref 0–5)

## 2021-09-22 RX ORDER — SODIUM CHLORIDE 9 MG/ML
1000 INJECTION, SOLUTION INTRAVENOUS
Refills: 0 | Status: DISCONTINUED | OUTPATIENT
Start: 2021-09-22 | End: 2021-09-24

## 2021-09-22 RX ORDER — CITRIC ACID/SODIUM CITRATE 300-500 MG
15 SOLUTION, ORAL ORAL EVERY 6 HOURS
Refills: 0 | Status: DISCONTINUED | OUTPATIENT
Start: 2021-09-22 | End: 2021-09-24

## 2021-09-22 RX ORDER — OXYTOCIN 10 UNIT/ML
333.33 VIAL (ML) INJECTION
Qty: 20 | Refills: 0 | Status: DISCONTINUED | OUTPATIENT
Start: 2021-09-22 | End: 2021-09-24

## 2021-09-22 RX ORDER — DINOPROSTONE 10 MG/241MG
10 INSERT VAGINAL ONCE
Refills: 0 | Status: COMPLETED | OUTPATIENT
Start: 2021-09-22 | End: 2021-09-22

## 2021-09-22 RX ADMIN — DINOPROSTONE 10 MILLIGRAM(S): 10 INSERT VAGINAL at 22:20

## 2021-09-22 NOTE — OB PROVIDER H&P - NSHPLABSRESULTS_GEN_ALL_CORE
last viral load sept 15 not detected          @20w4d EFW 13oz, MVP 6.46, anterior, vertex  @33w1d EFW [id number] 48%, bilateral polydactyly noted, bilateral choroid plexus noted, BPP 8/8, MVP 6.27, anterior placenta, vertex  @37w2d: EFW 6lbs 6oz (31%), anterior placenta, vertex presentation, MVP 5.46

## 2021-09-22 NOTE — OB PROVIDER H&P - NS_OBGYNHISTORY_OBGYN_ALL_OB_FT
OBhx: NSVDx2, second pregnancy was twins, FT largest 7lbs. Hx of ectopic pregnancy x1 s/p unilateral laparoscopic salpenjectomy.  Gyn: Denies cysts, fibroids, abnormal paps or STDs

## 2021-09-22 NOTE — OB PROVIDER H&P - NSHPPHYSICALEXAM_GEN_ALL_CORE
Vital Signs Last 24 Hrs  T(C): 36.7 (22 Sep 2021 21:07), Max: 36.7 (22 Sep 2021 21:07)  T(F): 98.1 (22 Sep 2021 21:07), Max: 98.1 (22 Sep 2021 21:07)  HR: 94 (22 Sep 2021 21:18) (94 - 94)  BP: 117/67 (22 Sep 2021 21:18) (117/67 - 117/67)  RR: 18 (22 Sep 2021 21:07) (18 - 18)    Gen: AOx3, no acute distress  Abd: soft, gravid, non tender, mildly palpable contractions  Ext: No edema bilaterally    EFM:  /mod/+accels; cat 1  [place]: q  SVE: /  /   vertex intact @ Vital Signs Last 24 Hrs  T(C): 36.7 (22 Sep 2021 21:07), Max: 36.7 (22 Sep 2021 21:07)  T(F): 98.1 (22 Sep 2021 21:07), Max: 98.1 (22 Sep 2021 21:07)  HR: 94 (22 Sep 2021 21:18) (94 - 94)  BP: 117/67 (22 Sep 2021 21:18) (117/67 - 117/67)  RR: 18 (22 Sep 2021 21:07) (18 - 18)    Gen: AOx3, no acute distress  Abd: soft, gravid, non tender, mildly palpable contractions  Ext: No edema bilaterally    EFM:  140/mod/+accels; cat 1  [place]: not warren  SVE: /  /   vertex intact @ Vital Signs Last 24 Hrs  T(C): 36.7 (22 Sep 2021 21:07), Max: 36.7 (22 Sep 2021 21:07)  T(F): 98.1 (22 Sep 2021 21:07), Max: 98.1 (22 Sep 2021 21:07)  HR: 94 (22 Sep 2021 21:18) (94 - 94)  BP: 117/67 (22 Sep 2021 21:18) (117/67 - 117/67)  RR: 18 (22 Sep 2021 21:07) (18 - 18)    Gen: AOx3, no acute distress  Abd: soft, gravid, non tender, mildly palpable contractions  Ext: No edema bilaterally    EFM:  140/mod/+accels; cat 1  [place]: not warren  SVE: 0/0/-3   vertex intact @ 10:00pm

## 2021-09-22 NOTE — OB PROVIDER H&P - ASSESSMENT
[age]y  @ 39w2ds, c/w HIV on Descovy 200mg-25mg 1tab qd, Genvoya [id number]-012dg-440sw-10mg 1tab qd, Tivicay 50mg 1tab qd, last viral load not detected, anemia (last hgb 9.4) on PO iron (non compliant), GBS negative, IOL at term.    Admit to L & D  IV hydration, labs  Continuous EFM & TOCO monitoring  Clear Liquid diet  Pain Management PRN  IOL w/ cervidil    Dr. Johnson and Dr. Alanis to be made aware. 36y  @ 39w2ds, c/w HIV on Descovy 200mg-25mg 1tab qd, Genvoya 279sg-999xj-083rt-10mg 1tab qd, Tivicay 50mg 1tab qd, last viral load not detected, anemia (last hgb 9.4) on PO iron (non compliant), CF carrier (FOB status unknown), GBS negative, IOL at term.    Admit to L & D  IV hydration, labs  Continuous EFM & TOCO monitoring  Clear Liquid diet  Pain Management PRN  IOL w/ cervidil    Dr. Johnson and Dr. Alanis to be made aware. 36y  @ 39w2ds, c/w HIV on Descovy 200mg-25mg 1tab qd, Tivicay 50mg 1tab qd, last viral load not detected, anemia (last hgb 9.4) on PO iron (non compliant), CF carrier (FOB status unknown), GBS negative, IOL at term.    Admit to L & D  IV hydration, labs  Continuous EFM & TOCO monitoring  Clear Liquid diet  Pain Management PRN  IOL w/ cervidil    Dr. Johnson and Dr. Alanis to be made aware. 36y  @ 39w2ds, c/w HIV on Descovy 200mg-25mg 1tab qd, Tivicay 50mg 1tab qd, last viral load not detected, anemia (last hgb 9.4) on PO iron (non compliant), CF carrier (FOB status unknown), GBS negative, IOL at term.    Admit to L & D  IV hydration, labs  Continuous EFM & TOCO monitoring  Clear Liquid diet  Pain Management PRN  IV zidovidine not indicated as last patient viral load was not detectable (sept 15)  IOL w/ cervidil    Dr. Johnson and Dr. Alanis to be made aware.

## 2021-09-23 LAB
AMPHET UR-MCNC: NEGATIVE — SIGNIFICANT CHANGE UP
BARBITURATES UR SCN-MCNC: NEGATIVE — SIGNIFICANT CHANGE UP
BENZODIAZ UR-MCNC: NEGATIVE — SIGNIFICANT CHANGE UP
BUPRENORPHINE SCREEN, URINE RESULT: NEGATIVE — SIGNIFICANT CHANGE UP
COCAINE METAB.OTHER UR-MCNC: NEGATIVE — SIGNIFICANT CHANGE UP
FENTANYL UR QL: NEGATIVE — SIGNIFICANT CHANGE UP
L&D DRUG SCREEN, URINE: SIGNIFICANT CHANGE UP
METHADONE UR-MCNC: NEGATIVE — SIGNIFICANT CHANGE UP
OPIATES UR-MCNC: NEGATIVE — SIGNIFICANT CHANGE UP
OXYCODONE UR-MCNC: NEGATIVE — SIGNIFICANT CHANGE UP
PCP UR-MCNC: NEGATIVE — SIGNIFICANT CHANGE UP
PROPOXYPHENE QUALITATIVE URINE RESULT: NEGATIVE — SIGNIFICANT CHANGE UP
VIRAL LOAD INTERP: NOT DETECTED COPIES/ML
VIRAL LOAD LOG: NOT DETECTED LOGCOPIES/ML

## 2021-09-23 RX ORDER — OXYTOCIN 10 UNIT/ML
2 VIAL (ML) INJECTION
Qty: 30 | Refills: 0 | Status: DISCONTINUED | OUTPATIENT
Start: 2021-09-23 | End: 2021-09-24

## 2021-09-23 RX ADMIN — Medication 2 MILLIUNIT(S)/MIN: at 09:21

## 2021-09-23 NOTE — PROGRESS NOTE ADULT - SUBJECTIVE AND OBJECTIVE BOX
Pt evaluated at bedside, comfortable at this time.     T(C): 36.7 (21 @ 21:07), Max: 36.7 (21 @ 21:07)  HR: 80 (21 @ 07:26) (75 - 96)  BP: 115/70 (21 @ 07:26) (96/54 - 148/64)  RR: 18 (21 @ 21:07) (18 - 18)    VE: 60/-3/soft/post  Cervidil removed  Ctx: irregular ctx  FHR: 135 moderate variability, cat I    36y.o.  @ 39.2wks IOL at term, HIV positive with undetectable viral load, GBS negatifve  - Continuous efm and toco  - Pain management PRN  - Will start pitocin  - dr. Sandy aware

## 2021-09-23 NOTE — PROGRESS NOTE ADULT - SUBJECTIVE AND OBJECTIVE BOX
PGY3 Note    Patient seen at bedside for evaluation of labor progression, doing well, no complaints. Epidural in place, pain well-controlled.     Vital Signs Last 24 Hrs  T(C): 36.7 (22 Sep 2021 21:07), Max: 36.7 (22 Sep 2021 21:07)  T(F): 98.1 (22 Sep 2021 21:07), Max: 98.1 (22 Sep 2021 21:07)  HR: 78 (23 Sep 2021 19:43) (47 - 96)  BP: 118/63 (23 Sep 2021 19:46) (86/55 - 148/64)  RR: 18 (22 Sep 2021 21:07) (18 - 18)  SpO2: 99% (23 Sep 2021 19:43) (94% - 100%)    EFM: 135/mod dede/+accels  TOCO: q2-4 mins  SVE: 1.5/90/-1, vertex, intact    Medications:  dinoprostone Insert: 10 milliGRAM(s) (21 @ 22:20) removed @0920  oxytocin Infusion.: 2 mL/Hr (21 @ 08:30) @20mU/min  Epidural started @1122    Labs:                        10.1   7.36  )-----------( 178      ( 22 Sep 2021 21:54 )             31.7           ABO RH Interpretation: B POS (21 @ 21:54)    Antibody Screen: NEG (21 @ 21:54)    Urinalysis Basic - ( 22 Sep 2021 21:07 )    Color: Light Yellow / Appearance: Clear / S.017 / pH: x  Gluc: x / Ketone: Negative  / Bili: Negative / Urobili: 3 mg/dL   Blood: x / Protein: Trace / Nitrite: Negative   Leuk Esterase: Small / RBC: 42 /HPF / WBC 7 /HPF   Sq Epi: x / Non Sq Epi: 4 /HPF / Bacteria: Few      L&amp;D Drug Screen, Urine: Done (21 @ 21:07)    Prenatal Syphilis Test: Nonreact (21 @ 21:54)              A/P:   36y  @ 39w2d, c/w HIV on Descovy 200mg-25mg 1tab qd, Tivicay 50mg 1tab qd, last viral load not detected, anemia (last hgb 9.4) on PO iron (non compliant), CF carrier (FOB status unknown), GBS negative, s/p cervidil, epidural in place, on pitocin for IOL.    - Cont EFM/[place]  - IV Hydration  - Pain Management prn  - Monitor vitals  - Clear Liquids  - cont w/ pitocin     Dr. Sandy  aware   PGY1 Note    Patient seen at bedside for evaluation of labor progression, doing well, no complaints. Epidural in place, pain well-controlled.     Vital Signs Last 24 Hrs  T(C): 36.7 (22 Sep 2021 21:07), Max: 36.7 (22 Sep 2021 21:07)  T(F): 98.1 (22 Sep 2021 21:07), Max: 98.1 (22 Sep 2021 21:07)  HR: 78 (23 Sep 2021 19:43) (47 - 96)  BP: 118/63 (23 Sep 2021 19:46) (86/55 - 148/64)  RR: 18 (22 Sep 2021 21:07) (18 - 18)  SpO2: 99% (23 Sep 2021 19:43) (94% - 100%)    EFM: 135/mod dede/+accels  TOCO: q2-4 mins  SVE: 1.5/90/-1, vertex, intact    Medications:  dinoprostone Insert: 10 milliGRAM(s) (21 @ 22:20) removed @0920  oxytocin Infusion.: 2 mL/Hr (21 @ 08:30) @20mU/min  Epidural started @1122    Labs:                        10.1   7.36  )-----------( 178      ( 22 Sep 2021 21:54 )             31.7           ABO RH Interpretation: B POS (21 @ 21:54)    Antibody Screen: NEG (21 @ 21:54)    Urinalysis Basic - ( 22 Sep 2021 21:07 )    Color: Light Yellow / Appearance: Clear / S.017 / pH: x  Gluc: x / Ketone: Negative  / Bili: Negative / Urobili: 3 mg/dL   Blood: x / Protein: Trace / Nitrite: Negative   Leuk Esterase: Small / RBC: 42 /HPF / WBC 7 /HPF   Sq Epi: x / Non Sq Epi: 4 /HPF / Bacteria: Few      L&amp;D Drug Screen, Urine: Done (21 @ 21:07)    Prenatal Syphilis Test: Nonreact (21 @ 21:54)              A/P:   36y  @ 39w2d, c/w HIV on Descovy 200mg-25mg 1tab qd, Tivicay 50mg 1tab qd, last viral load not detected, anemia (last hgb 9.4) on PO iron (non compliant), CF carrier (FOB status unknown), GBS negative, s/p cervidil, epidural in place, on pitocin for IOL.    - Cont EFM/[place]  - IV Hydration  - Pain Management prn  - Monitor vitals  - Clear Liquids  - cont w/ pitocin     Dr. Sandy  aware

## 2021-09-23 NOTE — PROGRESS NOTE ADULT - ASSESSMENT
36y  @ 39w2ds, c/w HIV on Descovy 200mg-25mg 1tab qd, Tivicay 50mg 1tab qd, last viral load not detected, anemia (last hgb 9.4) on PO iron (non compliant), CF carrier (FOB status unknown), GBS negative, IOL at term.    -continue cervidil in @   -pain management prn  -cont efm/toco  -f/u pending labs  -cont to monitor vitals  -cont iv hydration    Dr. Johnson and Dr. Alanis to be made aware. 36y  @ 39w2ds, c/w HIV on Descovy 200mg-25mg 1tab qd, Tivicay 50mg 1tab qd, last viral load not detected, anemia (last hgb 9.4) on PO iron (non compliant), CF carrier (FOB status unknown), GBS negative, IOL at term.    -continue cervidil in @ 222  -pain management prn  -cont efm/toco  -f/u L&D drug screen  -cont to monitor vitals  -cont iv hydration    Dr. Johnson and Dr. Alanis to be made aware.

## 2021-09-23 NOTE — PROGRESS NOTE ADULT - ASSESSMENT
36y  @ 39w2ds, c/w HIV on Descovy 200mg-25mg 1tab qd, Tivicay 50mg 1tab qd, last viral load not detected, anemia (last hgb 9.4) on PO iron (non compliant), CF carrier (FOB status unknown), GBS negative, on pitocin for IOL.    - Cont EFM/[place]  - IV Hydration  - Pain Management prn  - Monitor vitals  - Clear Liquids  - cont w/ pitocin     Dr. Sandy and Dr. Nguyen aware

## 2021-09-23 NOTE — PROGRESS NOTE ADULT - SUBJECTIVE AND OBJECTIVE BOX
PGY1 Note    Patient seen at bedside for evaluation of labor progression, doing well, no complaints. Denies fevers, chills, SOB, CP, abdominal pain or heavy vaginal bleeding.    T(F): 98.1 (21 @ 21:07), Max: 98.1 (21 @ 21:07)  HR: 88 (21 @ 23:58) (88 - 94)  BP: 112/56 (21 @ 23:58) (112/56 - 122/76)  RR: 18 (21 @ 21:07) (18 - 18)    EFM: 135/mod/accels  TOCO: q5  SVE: 0/0/-3    Medications:    dinoprostone Insert: 10 milliGRAM(s) (21 @ 22:20)      Labs:                        10.1   7.36  )-----------( 178      ( 22 Sep 2021 21:54 )             31.7           ABO RH Interpretation: B POS (21 @ 21:54)    Antibody Screen: NEG (21 @ 21:54)    Urinalysis Basic - ( 22 Sep 2021 21:07 )    Color: Light Yellow / Appearance: Clear / S.017 / pH: x  Gluc: x / Ketone: Negative  / Bili: Negative / Urobili: 3 mg/dL   Blood: x / Protein: Trace / Nitrite: Negative   Leuk Esterase: Small / RBC: 42 /HPF / WBC 7 /HPF   Sq Epi: x / Non Sq Epi: 4 /HPF / Bacteria: Few        Prenatal Syphilis Test: Nonreact (21 @ 21:54)

## 2021-09-23 NOTE — PROCEDURE NOTE - ADDITIONAL PROCEDURE DETAILS
Patient seen at bedside for change of shift. Pain is well controlled B/L, VSSS. Patient seen at bedside for change of shift. Pain is well controlled B/L, VSSS.    01:30 8cc .125% Bupivicaine for pain in vaginal area  01:40 VSS Pain well controlled.

## 2021-09-24 ENCOUNTER — TRANSCRIPTION ENCOUNTER (OUTPATIENT)
Age: 36
End: 2021-09-24

## 2021-09-24 LAB
BASOPHILS # BLD AUTO: 0.03 K/UL — SIGNIFICANT CHANGE UP (ref 0–0.2)
BASOPHILS NFR BLD AUTO: 0.2 % — SIGNIFICANT CHANGE UP (ref 0–1)
EOSINOPHIL # BLD AUTO: 0.05 K/UL — SIGNIFICANT CHANGE UP (ref 0–0.7)
EOSINOPHIL NFR BLD AUTO: 0.4 % — SIGNIFICANT CHANGE UP (ref 0–8)
HCT VFR BLD CALC: 24.3 % — LOW (ref 37–47)
HGB BLD-MCNC: 7.8 G/DL — LOW (ref 12–16)
IMM GRANULOCYTES NFR BLD AUTO: 0.9 % — HIGH (ref 0.1–0.3)
LYMPHOCYTES # BLD AUTO: 1.89 K/UL — SIGNIFICANT CHANGE UP (ref 1.2–3.4)
LYMPHOCYTES # BLD AUTO: 14.5 % — LOW (ref 20.5–51.1)
MCHC RBC-ENTMCNC: 28.6 PG — SIGNIFICANT CHANGE UP (ref 27–31)
MCHC RBC-ENTMCNC: 32.1 G/DL — SIGNIFICANT CHANGE UP (ref 32–37)
MCV RBC AUTO: 89 FL — SIGNIFICANT CHANGE UP (ref 81–99)
MONOCYTES # BLD AUTO: 1.19 K/UL — HIGH (ref 0.1–0.6)
MONOCYTES NFR BLD AUTO: 9.1 % — SIGNIFICANT CHANGE UP (ref 1.7–9.3)
NEUTROPHILS # BLD AUTO: 9.76 K/UL — HIGH (ref 1.4–6.5)
NEUTROPHILS NFR BLD AUTO: 74.9 % — SIGNIFICANT CHANGE UP (ref 42.2–75.2)
NRBC # BLD: 0 /100 WBCS — SIGNIFICANT CHANGE UP (ref 0–0)
PLATELET # BLD AUTO: 144 K/UL — SIGNIFICANT CHANGE UP (ref 130–400)
RBC # BLD: 2.73 M/UL — LOW (ref 4.2–5.4)
RBC # FLD: 18.7 % — HIGH (ref 11.5–14.5)
WBC # BLD: 13.04 K/UL — HIGH (ref 4.8–10.8)
WBC # FLD AUTO: 13.04 K/UL — HIGH (ref 4.8–10.8)

## 2021-09-24 PROCEDURE — 59409 OBSTETRICAL CARE: CPT | Mod: U9

## 2021-09-24 RX ORDER — DIPHENHYDRAMINE HCL 50 MG
25 CAPSULE ORAL EVERY 6 HOURS
Refills: 0 | Status: DISCONTINUED | OUTPATIENT
Start: 2021-09-24 | End: 2021-09-25

## 2021-09-24 RX ORDER — PRAMOXINE HYDROCHLORIDE 150 MG/15G
1 AEROSOL, FOAM RECTAL EVERY 4 HOURS
Refills: 0 | Status: DISCONTINUED | OUTPATIENT
Start: 2021-09-24 | End: 2021-09-25

## 2021-09-24 RX ORDER — BENZOCAINE 10 %
1 GEL (GRAM) MUCOUS MEMBRANE EVERY 6 HOURS
Refills: 0 | Status: DISCONTINUED | OUTPATIENT
Start: 2021-09-24 | End: 2021-09-25

## 2021-09-24 RX ORDER — DIBUCAINE 1 %
1 OINTMENT (GRAM) RECTAL EVERY 6 HOURS
Refills: 0 | Status: DISCONTINUED | OUTPATIENT
Start: 2021-09-24 | End: 2021-09-25

## 2021-09-24 RX ORDER — ACETAMINOPHEN 500 MG
975 TABLET ORAL
Refills: 0 | Status: DISCONTINUED | OUTPATIENT
Start: 2021-09-24 | End: 2021-09-25

## 2021-09-24 RX ORDER — TETANUS TOXOID, REDUCED DIPHTHERIA TOXOID AND ACELLULAR PERTUSSIS VACCINE, ADSORBED 5; 2.5; 8; 8; 2.5 [IU]/.5ML; [IU]/.5ML; UG/.5ML; UG/.5ML; UG/.5ML
0.5 SUSPENSION INTRAMUSCULAR ONCE
Refills: 0 | Status: DISCONTINUED | OUTPATIENT
Start: 2021-09-24 | End: 2021-09-25

## 2021-09-24 RX ORDER — SIMETHICONE 80 MG/1
80 TABLET, CHEWABLE ORAL EVERY 4 HOURS
Refills: 0 | Status: DISCONTINUED | OUTPATIENT
Start: 2021-09-24 | End: 2021-09-25

## 2021-09-24 RX ORDER — HYDROCORTISONE 1 %
1 OINTMENT (GRAM) TOPICAL EVERY 6 HOURS
Refills: 0 | Status: DISCONTINUED | OUTPATIENT
Start: 2021-09-24 | End: 2021-09-25

## 2021-09-24 RX ORDER — IBUPROFEN 200 MG
600 TABLET ORAL EVERY 6 HOURS
Refills: 0 | Status: DISCONTINUED | OUTPATIENT
Start: 2021-09-24 | End: 2021-09-25

## 2021-09-24 RX ORDER — KETOROLAC TROMETHAMINE 30 MG/ML
30 SYRINGE (ML) INJECTION ONCE
Refills: 0 | Status: DISCONTINUED | OUTPATIENT
Start: 2021-09-24 | End: 2021-09-24

## 2021-09-24 RX ORDER — ACETAMINOPHEN 500 MG
3 TABLET ORAL
Qty: 0 | Refills: 0 | DISCHARGE
Start: 2021-09-24

## 2021-09-24 RX ORDER — OXYTOCIN 10 UNIT/ML
20 VIAL (ML) INJECTION ONCE
Refills: 0 | Status: COMPLETED | OUTPATIENT
Start: 2021-09-24 | End: 2021-09-24

## 2021-09-24 RX ORDER — IBUPROFEN 200 MG
1 TABLET ORAL
Qty: 0 | Refills: 0 | DISCHARGE
Start: 2021-09-24

## 2021-09-24 RX ORDER — AER TRAVELER 0.5 G/1
1 SOLUTION RECTAL; TOPICAL EVERY 4 HOURS
Refills: 0 | Status: DISCONTINUED | OUTPATIENT
Start: 2021-09-24 | End: 2021-09-25

## 2021-09-24 RX ORDER — SODIUM CHLORIDE 9 MG/ML
3 INJECTION INTRAMUSCULAR; INTRAVENOUS; SUBCUTANEOUS EVERY 8 HOURS
Refills: 0 | Status: DISCONTINUED | OUTPATIENT
Start: 2021-09-24 | End: 2021-09-25

## 2021-09-24 RX ORDER — OXYCODONE HYDROCHLORIDE 5 MG/1
5 TABLET ORAL
Refills: 0 | Status: DISCONTINUED | OUTPATIENT
Start: 2021-09-24 | End: 2021-09-25

## 2021-09-24 RX ORDER — OXYCODONE HYDROCHLORIDE 5 MG/1
5 TABLET ORAL ONCE
Refills: 0 | Status: DISCONTINUED | OUTPATIENT
Start: 2021-09-24 | End: 2021-09-25

## 2021-09-24 RX ORDER — MAGNESIUM HYDROXIDE 400 MG/1
30 TABLET, CHEWABLE ORAL
Refills: 0 | Status: DISCONTINUED | OUTPATIENT
Start: 2021-09-24 | End: 2021-09-25

## 2021-09-24 RX ORDER — OXYTOCIN 10 UNIT/ML
333.33 VIAL (ML) INJECTION
Qty: 20 | Refills: 0 | Status: DISCONTINUED | OUTPATIENT
Start: 2021-09-24 | End: 2021-09-25

## 2021-09-24 RX ORDER — LANOLIN
1 OINTMENT (GRAM) TOPICAL EVERY 6 HOURS
Refills: 0 | Status: DISCONTINUED | OUTPATIENT
Start: 2021-09-24 | End: 2021-09-25

## 2021-09-24 RX ORDER — DOLUTEGRAVIR SODIUM 25 MG/1
50 TABLET, FILM COATED ORAL DAILY
Refills: 0 | Status: DISCONTINUED | OUTPATIENT
Start: 2021-09-24 | End: 2021-09-25

## 2021-09-24 RX ORDER — CARBOPROST TROMETHAMINE 250 UG/ML
250 INJECTION, SOLUTION INTRAMUSCULAR ONCE
Refills: 0 | Status: COMPLETED | OUTPATIENT
Start: 2021-09-24 | End: 2021-09-24

## 2021-09-24 RX ORDER — IBUPROFEN 200 MG
600 TABLET ORAL EVERY 6 HOURS
Refills: 0 | Status: COMPLETED | OUTPATIENT
Start: 2021-09-24 | End: 2022-08-23

## 2021-09-24 RX ORDER — EMTRICITABINE AND TENOFOVIR DISOPROXIL FUMARATE 200; 300 MG/1; MG/1
1 TABLET, FILM COATED ORAL DAILY
Refills: 0 | Status: DISCONTINUED | OUTPATIENT
Start: 2021-09-24 | End: 2021-09-25

## 2021-09-24 RX ADMIN — Medication 975 MILLIGRAM(S): at 21:04

## 2021-09-24 RX ADMIN — Medication 975 MILLIGRAM(S): at 16:05

## 2021-09-24 RX ADMIN — CARBOPROST TROMETHAMINE 250 MICROGRAM(S): 250 INJECTION, SOLUTION INTRAMUSCULAR at 04:13

## 2021-09-24 RX ADMIN — Medication 600 MILLIGRAM(S): at 18:40

## 2021-09-24 RX ADMIN — Medication 0.2 MILLIGRAM(S): at 04:06

## 2021-09-24 RX ADMIN — SODIUM CHLORIDE 3 MILLILITER(S): 9 INJECTION INTRAMUSCULAR; INTRAVENOUS; SUBCUTANEOUS at 07:03

## 2021-09-24 RX ADMIN — Medication 1 TABLET(S): at 12:11

## 2021-09-24 RX ADMIN — Medication 30 MILLIGRAM(S): at 10:00

## 2021-09-24 RX ADMIN — Medication 975 MILLIGRAM(S): at 10:00

## 2021-09-24 RX ADMIN — SODIUM CHLORIDE 3 MILLILITER(S): 9 INJECTION INTRAMUSCULAR; INTRAVENOUS; SUBCUTANEOUS at 12:13

## 2021-09-24 RX ADMIN — Medication 975 MILLIGRAM(S): at 16:45

## 2021-09-24 RX ADMIN — DOLUTEGRAVIR SODIUM 50 MILLIGRAM(S): 25 TABLET, FILM COATED ORAL at 12:11

## 2021-09-24 RX ADMIN — EMTRICITABINE AND TENOFOVIR DISOPROXIL FUMARATE 1 TABLET(S): 200; 300 TABLET, FILM COATED ORAL at 12:13

## 2021-09-24 RX ADMIN — OXYCODONE HYDROCHLORIDE 5 MILLIGRAM(S): 5 TABLET ORAL at 07:13

## 2021-09-24 RX ADMIN — Medication 975 MILLIGRAM(S): at 10:46

## 2021-09-24 RX ADMIN — SODIUM CHLORIDE 3 MILLILITER(S): 9 INJECTION INTRAMUSCULAR; INTRAVENOUS; SUBCUTANEOUS at 21:13

## 2021-09-24 RX ADMIN — Medication 30 MILLIGRAM(S): at 08:02

## 2021-09-24 RX ADMIN — Medication 600 MILLIGRAM(S): at 12:13

## 2021-09-24 RX ADMIN — Medication 20 UNIT(S): at 04:03

## 2021-09-24 RX ADMIN — Medication 600 MILLIGRAM(S): at 18:08

## 2021-09-24 NOTE — PROGRESS NOTE ADULT - SUBJECTIVE AND OBJECTIVE BOX
PGY1 Note    Patient seen at bedside for evaluation of labor progression, doing well, no complaints. Denies fevers, chills, SOB, CP, abdominal pain or heavy vaginal bleeding.    T(F): 98.42 (21 @ 02:00), Max: 98.42 (21 @ 02:00)  HR: 74 (21 @ 03:24) (47 - 99)  BP: 126/64 (21 @ 03:16) (86/55 - 131/62)  SpO2: 97% (21 @ 03:24) (87% - 100%)    EFM: 135/mod/accels  TOCO: q3-4  SVE: 4/90/-2 vertex AROM blood tinged @ 1945    Medications:  dinoprostone Insert: 10 milliGRAM(s) (21 @ 22:20)  oxytocin Infusion.: 2 mL/Hr (21 @ 08:30) current at 20mu      Labs:                        10.1   7.36  )-----------( 178      ( 22 Sep 2021 21:54 )             31.7               Urinalysis Basic - ( 22 Sep 2021 21:07 )    Color: Light Yellow / Appearance: Clear / S.017 / pH: x  Gluc: x / Ketone: Negative  / Bili: Negative / Urobili: 3 mg/dL   Blood: x / Protein: Trace / Nitrite: Negative   Leuk Esterase: Small / RBC: 42 /HPF / WBC 7 /HPF   Sq Epi: x / Non Sq Epi: 4 /HPF / Bacteria: Few

## 2021-09-24 NOTE — DISCHARGE NOTE OB - PATIENT PORTAL LINK FT
You can access the FollowMyHealth Patient Portal offered by [facility] by registering at the following website: [email or web address]. By joining [employer]’s FollowMyHealth portal, you will also be able to view your health information using other applications (apps) compatible with our system.

## 2021-09-24 NOTE — OB PROVIDER DELIVERY SUMMARY - NSVAGINALEXAMCERT_OBGYN_ALL_OB
Patient's mother, Allan Cloud called on nurse voicemail and said that she is interested in having her daughter's old OCP restarted Lyndon Joseph). She is due for her AE 5/24/17 with FW. Mom states that at that time she had [employer]BS and now they have [facility] so she can come in early for AE. Mother would like to know if FW would be willing to call in the RX for Zoe Dinero to get her started because her cycle is going on and she is very crampy. She said that she would like to have her start the pack on Sunday after her cycle as not to delay another month if possible.   She is really having a tough time with cramping and she said the Vestura 28 really helped her cramps in the past.       Leah:  Vianey Webster The Delivery OB Provider certifies that vaginal examination and/or abdominal examination after the delivery was done and no foreign body was found.

## 2021-09-24 NOTE — DISCHARGE NOTE OB - CARE PLAN
Principal Discharge DX:	Normal vaginal delivery  Assessment and plan of treatment:	healthy mother and baby   1

## 2021-09-24 NOTE — PROGRESS NOTE ADULT - ASSESSMENT
36y  @ 39w2d, c/w HIV on Descovy 200mg-25mg 1tab qd, Tivicay 50mg 1tab qd, last viral load not detected, anemia (last hgb 9.4) on PO iron (non compliant), CF carrier (FOB status unknown), GBS negative, s/p cervidil, epidural in place, s/p AROM blood tinged @ 1945, on pitocin for IOL.    - Cont EFM/[place]  - IV Hydration  - Pain Management prn  - Monitor vitals  - Clear Liquids  - cont w/ pitocin     Dr. Johnson and Dr. Arroyo to be made aware.

## 2021-09-24 NOTE — OB PROVIDER DELIVERY SUMMARY - NSPROVIDERDELIVERYNOTE_OBGYN_ALL_OB_FT
Patient was fully dilated and pushing. Fetal head was OA and restituted to ROT. The anterior and posterior shoulders delivered, followed by the remaining body atraumatically. The  was handed to the mother. Delayed cord clamping was performed, and then clamped and cut. Cord blood gases collected x2. The placenta delivered intact with membranes. Pitocin was administered, metherginex1, hemabatex1, pitocin 20U. Uterus massaged, fundus found to be firm. Cervix, vagina and perineum inspected no lacerations.    Viable female infant delivered, with APGARs 9/9       present for the delivery

## 2021-09-25 VITALS
SYSTOLIC BLOOD PRESSURE: 118 MMHG | RESPIRATION RATE: 18 BRPM | TEMPERATURE: 98 F | HEART RATE: 84 BPM | DIASTOLIC BLOOD PRESSURE: 65 MMHG

## 2021-09-25 LAB
BASOPHILS # BLD AUTO: 0.02 K/UL — SIGNIFICANT CHANGE UP (ref 0–0.2)
BASOPHILS NFR BLD AUTO: 0.2 % — SIGNIFICANT CHANGE UP (ref 0–1)
EOSINOPHIL # BLD AUTO: 0.08 K/UL — SIGNIFICANT CHANGE UP (ref 0–0.7)
EOSINOPHIL NFR BLD AUTO: 0.7 % — SIGNIFICANT CHANGE UP (ref 0–8)
HCT VFR BLD CALC: 24.1 % — LOW (ref 37–47)
HGB BLD-MCNC: 7.6 G/DL — LOW (ref 12–16)
IMM GRANULOCYTES NFR BLD AUTO: 1.4 % — HIGH (ref 0.1–0.3)
LYMPHOCYTES # BLD AUTO: 2.55 K/UL — SIGNIFICANT CHANGE UP (ref 1.2–3.4)
LYMPHOCYTES # BLD AUTO: 23.7 % — SIGNIFICANT CHANGE UP (ref 20.5–51.1)
MCHC RBC-ENTMCNC: 29.1 PG — SIGNIFICANT CHANGE UP (ref 27–31)
MCHC RBC-ENTMCNC: 31.5 G/DL — LOW (ref 32–37)
MCV RBC AUTO: 92.3 FL — SIGNIFICANT CHANGE UP (ref 81–99)
MONOCYTES # BLD AUTO: 0.74 K/UL — HIGH (ref 0.1–0.6)
MONOCYTES NFR BLD AUTO: 6.9 % — SIGNIFICANT CHANGE UP (ref 1.7–9.3)
NEUTROPHILS # BLD AUTO: 7.23 K/UL — HIGH (ref 1.4–6.5)
NEUTROPHILS NFR BLD AUTO: 67.1 % — SIGNIFICANT CHANGE UP (ref 42.2–75.2)
NRBC # BLD: 0 /100 WBCS — SIGNIFICANT CHANGE UP (ref 0–0)
PLATELET # BLD AUTO: 157 K/UL — SIGNIFICANT CHANGE UP (ref 130–400)
RBC # BLD: 2.61 M/UL — LOW (ref 4.2–5.4)
RBC # FLD: 19.1 % — HIGH (ref 11.5–14.5)
WBC # BLD: 10.77 K/UL — SIGNIFICANT CHANGE UP (ref 4.8–10.8)
WBC # FLD AUTO: 10.77 K/UL — SIGNIFICANT CHANGE UP (ref 4.8–10.8)

## 2021-09-25 PROCEDURE — 99238 HOSP IP/OBS DSCHRG MGMT 30/<: CPT

## 2021-09-25 RX ORDER — FERROUS SULFATE 325(65) MG
1 TABLET ORAL
Qty: 270 | Refills: 0
Start: 2021-09-25 | End: 2021-12-23

## 2021-09-25 RX ADMIN — Medication 600 MILLIGRAM(S): at 05:14

## 2021-09-25 RX ADMIN — DOLUTEGRAVIR SODIUM 50 MILLIGRAM(S): 25 TABLET, FILM COATED ORAL at 12:04

## 2021-09-25 RX ADMIN — Medication 600 MILLIGRAM(S): at 12:08

## 2021-09-25 RX ADMIN — EMTRICITABINE AND TENOFOVIR DISOPROXIL FUMARATE 1 TABLET(S): 200; 300 TABLET, FILM COATED ORAL at 12:07

## 2021-09-25 RX ADMIN — Medication 975 MILLIGRAM(S): at 10:32

## 2021-09-25 RX ADMIN — Medication 600 MILLIGRAM(S): at 03:27

## 2021-09-25 RX ADMIN — Medication 600 MILLIGRAM(S): at 05:28

## 2021-09-25 RX ADMIN — Medication 600 MILLIGRAM(S): at 19:11

## 2021-09-25 RX ADMIN — Medication 600 MILLIGRAM(S): at 00:21

## 2021-09-25 RX ADMIN — Medication 975 MILLIGRAM(S): at 00:17

## 2021-09-25 RX ADMIN — SODIUM CHLORIDE 3 MILLILITER(S): 9 INJECTION INTRAMUSCULAR; INTRAVENOUS; SUBCUTANEOUS at 05:28

## 2021-09-25 RX ADMIN — SODIUM CHLORIDE 3 MILLILITER(S): 9 INJECTION INTRAMUSCULAR; INTRAVENOUS; SUBCUTANEOUS at 15:35

## 2021-09-25 RX ADMIN — Medication 1 TABLET(S): at 12:09

## 2021-09-25 NOTE — PROGRESS NOTE ADULT - SUBJECTIVE AND OBJECTIVE BOX
Chief Complaint: Postpartum    HPI: Pt doing well, pain well controlled. No overnight events, no acute complaints. She is ambulating, voiding and bottle feedinG. Denies fever, chills, SOB, chest pain, abdominal pain or heavy vaginal bleeding.     ROS: Denies cardiovascular or respiratory symptoms    PAST MEDICAL & SURGICAL HISTORY:  HIV disease    History of unilateral salpingectomy  right        Physical Exam  Vital Signs Last 24 Hrs  T(F): 96.2 (25 Sep 2021 08:57), Max: 98 (25 Sep 2021 00:10)  HR: 79 (25 Sep 2021 08:57) (79 - 91)  BP: 100/61 (25 Sep 2021 08:57) (100/61 - 127/58)  RR: 18 (25 Sep 2021 08:57) (18 - 20)    Physical exam:  General - AAOx3, NAD  Heart - S1S2, RRR  Lungs - CTA BL  Abdomen:  - Soft, nontender, nondistended, BS+  - Fundus firm, nontender, below the umbilicus  Pelvis/Vagina - Normal Lochia  Extremities - No calf tenderness, no swelling    Labs:                        7.6    10.77 )-----------( 157      ( 25 Sep 2021 04:30 )             24.1                         7.8    13.04 )-----------( 144      ( 24 Sep 2021 17:48 )             24.3     ABO RH Interpretation: B POS (09-22-21 @ 21:54)  Antibody Screen: NEG (09-22-21 @ 21:54)

## 2021-09-25 NOTE — PROGRESS NOTE ADULT - ASSESSMENT
37 y/o P3 s/p , PPD#1, h/o HIV, complaint with antiretroviral therapy, undetected VL, h/o chronic anemia, asymptomatic, recovering well  - Continue routine postpartum care  - Pain management prn  - Encourage ambulation, oral hydration  - Monitor vitals and bleeding  - anticipate discharge home    Dr. Robins and Dr. Brown to be made aware

## 2021-09-27 DIAGNOSIS — O35.8XX0 MATERNAL CARE FOR OTHER (SUSPECTED) FETAL ABNORMALITY AND DAMAGE, NOT APPLICABLE OR UNSPECIFIED: ICD-10-CM

## 2021-09-27 DIAGNOSIS — Z14.1 CYSTIC FIBROSIS CARRIER: ICD-10-CM

## 2021-09-27 DIAGNOSIS — B20 HUMAN IMMUNODEFICIENCY VIRUS [HIV] DISEASE: ICD-10-CM

## 2021-09-27 DIAGNOSIS — Z90.79 ACQUIRED ABSENCE OF OTHER GENITAL ORGAN(S): ICD-10-CM

## 2021-09-27 DIAGNOSIS — Z3A.39 39 WEEKS GESTATION OF PREGNANCY: ICD-10-CM

## 2021-09-27 DIAGNOSIS — Z88.0 ALLERGY STATUS TO PENICILLIN: ICD-10-CM

## 2021-09-27 DIAGNOSIS — Z91.013 ALLERGY TO SEAFOOD: ICD-10-CM

## 2021-09-27 DIAGNOSIS — Z91.14 PATIENT'S OTHER NONCOMPLIANCE WITH MEDICATION REGIMEN: ICD-10-CM

## 2021-11-12 ENCOUNTER — LABORATORY RESULT (OUTPATIENT)
Age: 36
End: 2021-11-12

## 2021-11-12 ENCOUNTER — APPOINTMENT (OUTPATIENT)
Dept: ANTEPARTUM | Facility: CLINIC | Age: 36
End: 2021-11-12
Payer: MEDICAID

## 2021-11-12 ENCOUNTER — OUTPATIENT (OUTPATIENT)
Dept: OUTPATIENT SERVICES | Facility: HOSPITAL | Age: 36
LOS: 1 days | Discharge: HOME | End: 2021-11-12

## 2021-11-12 VITALS
HEIGHT: 66 IN | OXYGEN SATURATION: 100 % | DIASTOLIC BLOOD PRESSURE: 65 MMHG | SYSTOLIC BLOOD PRESSURE: 145 MMHG | BODY MASS INDEX: 27 KG/M2 | WEIGHT: 168 LBS | HEART RATE: 79 BPM | TEMPERATURE: 98.3 F

## 2021-11-12 DIAGNOSIS — Z90.79 ACQUIRED ABSENCE OF OTHER GENITAL ORGAN(S): Chronic | ICD-10-CM

## 2021-11-12 PROCEDURE — 99213 OFFICE O/P EST LOW 20 MIN: CPT

## 2021-11-12 RX ORDER — NORETHINDRONE ACETATE AND ETHINYL ESTRADIOL 1; 20 MG/1; UG/1
1-20 TABLET ORAL DAILY
Qty: 1 | Refills: 7 | Status: ACTIVE | COMMUNITY
Start: 2021-11-12 | End: 1900-01-01

## 2021-11-12 NOTE — ATTENDING NOTE
[Staffed While Pt in Clinic, Pt Seen/Examined by Me] : Staffed while patient in clinic, patient seen and examined by me [] : I agree with the Resident management as it was presented to me [FreeTextEntry2] : 35y  delivered 23, pregnancy c/b HIV dx'd 10 y ago.  Ms Mae reports satisfactory recovery and has help with her daughter at home.  She states no missed doses of ARV in the last week.   [FreeTextEntry1] : Fundus nonpalp.  [FreeTextEntry4] : Stable p partum.\par Pt requests OCP for birth control.  We will research latest findings re: drug interactions for Rx.\par Pt understands NOT to stop OCP if irreg spotting or bleeding.

## 2021-11-12 NOTE — END OF VISIT
[] : Resident [FreeTextEntry3] : Please see my note above.  [Time Spent: ___ minutes] : I have spent [unfilled] minutes of time on the encounter.

## 2021-11-12 NOTE — HISTORY OF PRESENT ILLNESS
[Complications:___] : complications include: [unfilled] [Last Pap Date: ___] : Last Pap Date: [unfilled] [Delivery Date: ___] : on [unfilled] [] : delivered by vaginal delivery [Female] : Delivery History: baby girl [Back to Normal] : is back to normal in size [None] : no vaginal bleeding [Awake] : awake [Alert] : alert [Soft] : soft [Oriented x3] : oriented to person, place, and time [Normal] : uterus [Labia Majora] : labia major [No Bleeding] : there was no active vaginal bleeding [RRR, No Murmurs] : RRR, no murmurs [CTAB] : CTAB [Doing Well] : is doing well [Not Done] : Examination of breasts not done [Breastfeeding] : not currently nursing [Acute Distress] : no acute distress [LAD] : no lymphadenopathy [Mass] : no breast mass [Tender] : non tender [Distended] : not distended [Postpartum Follow Up] : postpartum follow up

## 2021-11-15 DIAGNOSIS — Z30.019 ENCOUNTER FOR INITIAL PRESCRIPTION OF CONTRACEPTIVES, UNSPECIFIED: ICD-10-CM

## 2021-11-15 DIAGNOSIS — Z21 ASYMPTOMATIC HUMAN IMMUNODEFICIENCY VIRUS [HIV] INFECTION STATUS: ICD-10-CM

## 2021-11-19 LAB — HPV HIGH+LOW RISK DNA PNL CVX: NOT DETECTED

## 2022-03-07 LAB — CYTOLOGY CVX/VAG DOC THIN PREP: NORMAL

## 2022-03-31 NOTE — ED PROVIDER NOTE - NO SIGNIFICANT PAST SURGICAL HISTORY
Patient : Chuck Gastelum Age: 89 year old Sex: male   MRN: [id number] Encounter Date: 3/31/2022      History     Chief Complaint   Patient presents with   • Shortness of Breath     HPI patient is a pleasant 89-year-old male with history of diabetes, dyslipidemia, hypertension, former smoker presents with productive cough and shortness of breath.  Patient has been seen in the ER on multiple occasions.  Most recent was yesterday way of a CT PE protocol which was negative for PE showed COPD changes with possible early infiltrate.  Patient has been on doxycycline, azithromycin and albuterol inhaler with no improvement of the symptoms.  Today he arrived mildly hypoxic satting 88% on room air.  Patient does report chest pain with cough.  Patient denies fever, chills with nausea vomiting.    No Known Allergies    Current Discharge Medication List      Prior to Admission Medications    Details   azithromycin (ZITHROMAX) 250 MG tablet Take 2 tablets by mouth daily for 1 day, THEN 1 tablet daily for 4 days.  Qty: 6 tablet, Refills: 0      predniSONE (DELTASONE) 20 MG tablet Take 2 tablets by mouth daily for 3 days, THEN 1 tablet daily for 2 days, THEN 0.5 tablets daily for 2 days.  Qty: 10 tablet, Refills: 0      albuterol 108 (90 Base) MCG/ACT inhaler Inhale 2 puffs into the lungs every 4 hours as needed for Wheezing.  Qty: 8.5 g, Refills: 0      doxycycline monohydrate (ADOXA) 100 MG tablet Take 1 tablet by mouth 2 times daily.  Qty: 28 tablet, Refills: 0      ketorolac (ACULAR) 0.5 % ophthalmic solution Instill one drop twice daily starting in surgical eye Right Eye 02/23/2022 Left Eye 03/09/2022 Continue for 3 weeks.  Qty: 10 mL, Refills: 1      atorvastatin (LIPITOR) 40 MG tablet Take 1 tablet by mouth daily.  Qty: 90 tablet, Refills: 3      carvedilol (COREG) 3.125 MG tablet Take 1 tablet by mouth 2 times daily (with meals).  Qty: 180 tablet, Refills: 3      allopurinol (ZYLOPRIM) 300 MG tablet Take 1 tablet by mouth  daily.  Qty: 90 tablet, Refills: 3      traZODone (DESYREL) 50 MG tablet Take 1 tablet by mouth nightly as needed for Sleep.  Qty: 30 tablet, Refills: 1      amLODIPine (NORVASC) 5 MG tablet Take 1 tablet by mouth 2 times daily.  Qty: 180 tablet, Refills: 3      hydrochlorothiazide (HYDRODIURIL) 25 MG tablet Take 1 tablet by mouth daily.  Qty: 90 tablet, Refills: 3      metFORMIN (GLUCOPHAGE) 500 MG tablet Take 1 tablet by mouth daily.  Qty: 90 tablet, Refills: 3      pantoprazole (PROTONIX) 20 MG tablet Take 1 tablet by mouth daily.  Qty: 90 tablet, Refills: 3      quinapril (ACCUPRIL) 10 MG tablet TAKE 1 TABLET BY MOUTH DAILY  Qty: 90 tablet, Refills: 3      polyethylene glycol (MIRALAX) 17 g packet Take 17 g by mouth daily. Stir and dissolve powder in any 4 to 8 ounces of beverage, then drink.  Qty: 30 each, Refills: 11      ALPRAZolam (XANAX) 0.25 MG tablet Take 1 tablet by mouth daily as needed for Sleep or Anxiety.  Qty: 30 tablet, Refills: 1      nitroGLYcerin (NITROSTAT) 0.4 MG sublingual tablet Place 1 tablet under the tongue every 5 minutes as needed for Chest pain.  Qty: 25 tablet, Refills: 0      aspirin 81 MG tablet Take 81 mg by mouth daily.      DISPENSE Home blood pressure monitor  Qty: 1 Units, Refills: 0      fish oil-omega-3 fatty acids 1000 MG CAPS Take 1 capsule by mouth 2 times daily.             Past Medical History:   Diagnosis Date   • Atherosclerotic heart disease    • BPH (benign prostatic hypertrophy)    • Diabetes mellitus, type 2 (CMS/HCC)    • Hyperlipidemia    • Hypertension    • Myocardial infarction (CMS/HCC)    • Nephrolithiasis        Past Surgical History:   Procedure Laterality Date   • CARDIAC CATHERIZATION     • EXTRACAPSULAR CATARACT REMOVAL W INSERT IO LENS PROSTH WO ECP Right 03/02/2022    Cullen Forde MD   • EXTRACAPSULAR CATARACT REMOVAL W INSERT IO LENS PROSTH WO ECP Left 03/16/2022    Cullen Forde MD   • JOINT REPLACEMENT Bilateral     knees   • PTCA WITH STENT   2007    2 stents   • TRANSURETHRAL ELEC-SURG PROSTATECTOM  2010       Family History   Problem Relation Age of Onset   • Cancer Mother    • Cancer Father    • Cancer Brother    • Crohn's Disease Daughter    • Patient is unaware of any medical problems Son    • Patient is unaware of any medical problems Daughter    • Patient is unaware of any medical problems Daughter        Social History     Tobacco Use   • Smoking status: Former Smoker     Packs/day: 0.50     Years: 45.00     Pack years: 22.50     Quit date: 2001     Years since quittin.2   • Smokeless tobacco: Never Used   Vaping Use   • Vaping Use: never used   Substance Use Topics   • Alcohol use: Not Currently     Alcohol/week: 0.0 - 1.0 standard drinks     Comment: Social, every 3 months or so   • Drug use: No       E-cigarette/Vaping   • E-Cigarette/Vaping Use Never Used      E-Cigarette/Vaping Substances & Devices       Review of Systems  Constitutional: Denies fevers or chills  Eyes: Denies vision changes  ENMT: Denies sore throat  CV:  Reports chest pain with cough  Resp: Denies SOB, reports productive cough  GI: Denies vomiting or diarrhea  : Denies painful urination  MSK: Denies recent trauma  Skin: Denies new rashes  Neuro: Denies new numbness or tingling or weakness  Endocrine: Denies unexpected weight loss  Heme: Denies bleeding disorders    Physical Exam     ED Triage Vitals   ED Triage Vitals Group      Temp 22 0945 97.9 °F (36.6 °C)      Heart Rate 22 0945 97      Resp 22 0945 (!) 30      BP 22 0945 (!) 168/93      SpO2 22 0945 93 %      EtCO2 mmHg --       Height --       Weight 22 1331 219 lb 5.7 oz (99.5 kg)      Weight Scale Used --       BMI (Calculated) --       IBW/kg (Calculated) --        Physical Exam  General: Patient is well nourished, well developed, awake and alert, resting comfortably in no acute distress  Head: Normocephalic and atraumatic  Eyes: Normal inspection,  extraocular muscles intact, no conjunctival pallor, Pupils round and reactive to light  Ear, nose, throat: Normal external exam, Normal tm's b/l  Neck: Normal range of motion, no palpable lymphadenopathy   Respiratory: Patient is in no respiratory distress, lungs CTAB  Cardiovascular: Patient is not tachycardic, RRR without murmur appreciated  GI: Abd SNT with no guarding or rebound, no tympanny to percussion  Back: Normal inspection of the back with good strength and range of motion throughout all ext  Extremities: pulses intact with good cap refills, no LE pitting edema or calf tenderness  Neuro: The patient is alert and oriented to person, place, and time, appropriately conversive, with 5/5 bilat UE/LE strength, no gross motor or sensory defects noted. Coordination appears to be adequate.  Skin: Warm, dry, and intact         ED Course     Procedures    Lab Results     Results for orders placed or performed during the hospital encounter of 03/31/22   Comprehensive Metabolic Panel   Result Value Ref Range    Fasting Status      Sodium 140 135 - 145 mmol/L    Potassium 4.4 3.4 - 5.1 mmol/L    Chloride 103 98 - 107 mmol/L    Carbon Dioxide 22 21 - 32 mmol/L    Anion Gap 19 10 - 20 mmol/L    Glucose 307 (H) 70 - 99 mg/dL    BUN 53 (H) 6 - 20 mg/dL    Creatinine 1.05 0.67 - 1.17 mg/dL    Glomerular Filtration Rate 63 >=60    BUN/ Creatinine Ratio 50 (H) 7 - 25    Calcium 9.6 8.4 - 10.2 mg/dL    Bilirubin, Total 0.7 0.2 - 1.0 mg/dL    GOT/AST 39 (H) <=37 Units/L    GPT/ALT 71 (H) <64 Units/L    Alkaline Phosphatase 142 (H) 45 - 117 Units/L    Albumin 3.3 (L) 3.6 - 5.1 g/dL    Protein, Total 7.4 6.4 - 8.2 g/dL    Globulin 4.1 (H) 2.0 - 4.0 g/dL    A/G Ratio 0.8 (L) 1.0 - 2.4   TROPONIN I, HIGH SENSITIVITY   Result Value Ref Range    Troponin I, High Sensitivity 147 (HH) <77 ng/L   Prothrombin Time   Result Value Ref Range    Prothrombin Time 10.8 9.7 - 11.8 sec    INR 1.0     Partial Thromboplastin Time   Result Value  Ref Range    PTT 26 22 - 30 sec   NT proBNP   Result Value Ref Range    NT-proBNP 4,102 (H) <=450 pg/mL   Procalcitonin   Result Value Ref Range    Procalcitonin 0.14 (H) <=0.09 ng/mL   C Reactive Protein   Result Value Ref Range    C-Reactive Protein 1.7 (H) <=1.0 mg/dL   SARS-COV-2/INFLUENZA BY PCR   Result Value Ref Range    Rapid SARS-COV-2 by PCR Not Detected Not Detected / Detected / Presumptive Positive / Inhibitors present    Influenza A by PCR Not Detected Not Detected    Influenza B by PCR Not Detected Not Detected    Isolation Guidelines      Procedural Comment     CBC with Automated Differential (performable only)   Result Value Ref Range    WBC 18.9 (H) 4.2 - 11.0 K/mcL    RBC 5.14 4.50 - 5.90 mil/mcL    HGB 15.4 13.0 - 17.0 g/dL    HCT 45.6 39.0 - 51.0 %    MCV 88.7 78.0 - 100.0 fl    MCH 30.0 26.0 - 34.0 pg    MCHC 33.8 32.0 - 36.5 g/dL    RDW-CV 14.6 11.0 - 15.0 %    RDW-SD 47.7 39.0 - 50.0 fL     140 - 450 K/mcL    NRBC 0 <=0 /100 WBC    Neutrophil, Percent 77 %    Lymphocytes, Percent 12 %    Mono, Percent 9 %    Eosinophils, Percent 0 %    Basophils, Percent 0 %    Immature Granulocytes 2 %    Absolute Neutrophils 14.7 (H) 1.8 - 7.7 K/mcL    Absolute Lymphocytes 2.3 1.0 - 4.0 K/mcL    Absolute Monocytes 1.7 (H) 0.3 - 0.9 K/mcL    Absolute Eosinophils  0.0 0.0 - 0.5 K/mcL    Absolute Basophils 0.0 0.0 - 0.3 K/mcL    Absolute Immmature Granulocytes 0.3 (H) 0.0 - 0.2 K/mcL   Lactic Acid Venous With Reflex   Result Value Ref Range    Lactate, Venous 4.8 (HH) 0.0 - 2.0 mmol/L   Lactic Acid, Venous   Result Value Ref Range    Lactate, Venous 2.6 (HH) 0.0 - 2.0 mmol/L   TROPONIN I, HIGH SENSITIVITY   Result Value Ref Range    Troponin I, High Sensitivity 165 (HH) <77 ng/L   Blood Culture    Specimen: Blood   Result Value Ref Range    Culture, Blood or Bone Marrow No Growth <24 hours    Blood Culture    Specimen: Blood   Result Value Ref Range    Culture, Blood or Bone Marrow No Growth <24 hours         EKG Results     EKG Interpretation  Rate: 91 bpm  Rhythm: normal sinus rhythm   Abnormality: no  No ST segment elevation, incomplete right bundle-branch block, nonspecific ST changes, left axis deviation  EKG tracing interpreted by ED physician    Radiology Results     Imaging Results          XR CHEST AP OR PA - PORTABLE (Final result)  Result time 03/31/22 10:27:04    Final result                 Impression:    IMPRESSION:    Coarsened interstitial changes with minimal alveolar infiltrative changes  within both lungs.                   Narrative:    EXAM: XR CHEST AP OR PA    CLINICAL HISTORY: dyspnea    COMPARISON: 3/29/2022    FINDINGS: The cardiac silhouette is within normal limits.    Coarsened interstitial and alveolar densities are present within the lungs  without lobar consolidation.    No significant pleural effusions are present.                                ED Medication Orders (From admission, onward)    Ordered Start     Status Ordering Provider    03/31/22 1109 03/31/22 1110  sodium chloride (NORMAL SALINE) 0.9 % bolus 1,000 mL  ONCE         Last MAR action: Completed BECCA VENTURA    03/31/22 1031 03/31/22 1032  cefTRIAXone (ROCEPHIN) syringe 2,000 mg  ONCE         Last MAR action: Given BECCA VENTURA    03/31/22 1031 03/31/22 1032  azithromycin (ZITHROMAX) 500 mg in sodium chloride 0.9 % 250 mL IVPB  ONCE         Last MAR action: Completed BECCA VENTURA    03/31/22 0950 03/31/22 0950  albuterol (VENTOLIN) nebulizer 5 mg  ONCE         Last MAR action: Given BECCA VENTURA    03/31/22 0950 03/31/22 0951  ipratropium (ATROVENT) 0.02 % nebulizer solution 0.5 mg  ONCE         Last MAR action: Given BECCA VENTURA               [facility]      10:44 AM patient is feeling much better after breathing treatment.  He was able to be liberated from nasal cannula although still remains tachypneic.  Plan at this point due to patient's multiple returns to ER and episode of hypoxemia will be for  admission continued breathing treatments and IV antibiotics    11:48 AM patient resting comfortably and is currently chest pain-free.  Awaiting 2nd troponin and repeat lactic.    Repeat lactic shows improvement, troponin with mild elevation.  Discussed with hospitalist who agrees with admission and request cardiac consultation given chest pain with cough and elevated troponin.    Called and discussed case with Dr. Bermeo who is on-call for Cardiology.  He recommended treating patient for sepsis as this is likely root cause of patient's troponin elevation.  He has agreed to consult on patient.  Clinical Impression     ED Diagnosis   1. Chronic obstructive pulmonary disease, unspecified COPD type (CMS/HCC)     2. Acute hypoxemic respiratory failure (CMS/HCC)     3. Pneumonia due to infectious organism, unspecified laterality, unspecified part of lung     4. Elevated troponin         Disposition        Admit 3/31/2022  1:20 PM  Telemetry Bed?: Yes  Admitting Physician: SHAYLA MELTON [[id number]]  Is this a telephone or verbal order?: This is a telephone order from the admitting physician                     Isai Scott,   03/31/22 8472     <<----- Click to add NO significant Past Surgical History

## 2022-06-17 ENCOUNTER — NON-APPOINTMENT (OUTPATIENT)
Age: 37
End: 2022-06-17

## 2022-10-10 NOTE — ED PROVIDER NOTE - IV ALTEPLASE EXCL ABS HIDDEN
show Fusiform Excision Additional Text (Leave Blank If You Do Not Want): The margin was drawn around the clinically apparent lesion.  A fusiform shape was then drawn on the skin incorporating the lesion and margins.  Incisions were then made along these lines to the appropriate tissue plane and the lesion was extirpated.

## 2022-12-07 ENCOUNTER — EMERGENCY (EMERGENCY)
Facility: HOSPITAL | Age: 37
LOS: 0 days | Discharge: LEFT BEFORE DISCHARGE | End: 2022-12-07
Admitting: EMERGENCY MEDICINE

## 2022-12-07 VITALS
RESPIRATION RATE: 16 BRPM | OXYGEN SATURATION: 100 % | DIASTOLIC BLOOD PRESSURE: 76 MMHG | SYSTOLIC BLOOD PRESSURE: 135 MMHG | WEIGHT: 145.06 LBS | HEART RATE: 89 BPM | TEMPERATURE: 98 F

## 2022-12-07 DIAGNOSIS — Z90.79 ACQUIRED ABSENCE OF OTHER GENITAL ORGAN(S): Chronic | ICD-10-CM

## 2022-12-07 DIAGNOSIS — N93.9 ABNORMAL UTERINE AND VAGINAL BLEEDING, UNSPECIFIED: ICD-10-CM

## 2022-12-07 DIAGNOSIS — Z53.21 PROCEDURE AND TREATMENT NOT CARRIED OUT DUE TO PATIENT LEAVING PRIOR TO BEING SEEN BY HEALTH CARE PROVIDER: ICD-10-CM

## 2022-12-07 PROCEDURE — L9991: CPT

## 2022-12-07 NOTE — ED ADULT TRIAGE NOTE - CHIEF COMPLAINT QUOTE
pt c/o vaginal bleeding x4 months, reports feeling fatigue. states she has required iron transfusions in the past and has fibroids

## 2023-03-14 NOTE — OB PROVIDER IHI INDUCTION/AUGMENTATION NOTE - NSCHECKLIST_OBGYN_ALL_OB_CAL
5 Terbinafine Counseling: Patient counseling regarding adverse effects of terbinafine including but not limited to headache, diarrhea, rash, upset stomach, liver function test abnormalities, itching, taste/smell disturbance, nausea, abdominal pain, and flatulence.  There is a rare possibility of liver failure that can occur when taking terbinafine.  The patient understands that a baseline LFT and kidney function test may be required. The patient verbalized understanding of the proper use and possible adverse effects of terbinafine.  All of the patient's questions and concerns were addressed.

## 2024-06-18 NOTE — ED ADULT NURSE NOTE - NSHISCREENINGQ1_ED_A_ED
History of chronic alcohol abuse in the past.  Currently patient is asymptomatic.  I would again strongly encourage her to establish care with hematology.   No

## 2024-07-17 ENCOUNTER — OUTPATIENT (OUTPATIENT)
Dept: OUTPATIENT SERVICES | Facility: HOSPITAL | Age: 39
LOS: 1 days | End: 2024-07-17

## 2024-07-17 ENCOUNTER — APPOINTMENT (OUTPATIENT)
Dept: INFECTIOUS DISEASE | Facility: CLINIC | Age: 39
End: 2024-07-17

## 2024-07-17 ENCOUNTER — NON-APPOINTMENT (OUTPATIENT)
Age: 39
End: 2024-07-17

## 2024-07-17 VITALS
RESPIRATION RATE: 15 BRPM | OXYGEN SATURATION: 100 % | SYSTOLIC BLOOD PRESSURE: 109 MMHG | WEIGHT: 163 LBS | DIASTOLIC BLOOD PRESSURE: 63 MMHG | BODY MASS INDEX: 26.2 KG/M2 | HEART RATE: 75 BPM | TEMPERATURE: 98.3 F | HEIGHT: 66 IN

## 2024-07-17 DIAGNOSIS — B20 HUMAN IMMUNODEFICIENCY VIRUS [HIV] DISEASE: ICD-10-CM

## 2024-07-17 DIAGNOSIS — E78.5 HYPERLIPIDEMIA, UNSPECIFIED: ICD-10-CM

## 2024-07-17 DIAGNOSIS — Z90.79 ACQUIRED ABSENCE OF OTHER GENITAL ORGAN(S): Chronic | ICD-10-CM

## 2024-07-17 LAB
ALBUMIN SERPL ELPH-MCNC: 4.4 G/DL
ALP BLD-CCNC: 58 U/L
ALT SERPL-CCNC: 12 U/L
ANION GAP SERPL CALC-SCNC: 12 MMOL/L
AST SERPL-CCNC: 14 U/L
BILIRUB SERPL-MCNC: <0.2 MG/DL
BUN SERPL-MCNC: 11 MG/DL
CALCIUM SERPL-MCNC: 8.7 MG/DL
CD16+CD56+ CELLS # BLD: 335 /UL
CD16+CD56+ CELLS NFR BLD: 13 %
CD19 CELLS NFR BLD: 320 /UL
CD3 CELLS # BLD: 1752 /UL
CD3 CELLS NFR BLD: 71 %
CD3+CD4+ CELLS # BLD: 686 /UL
CD3+CD4+ CELLS NFR BLD: 28 %
CD3+CD4+ CELLS/CD3+CD8+ CLL SPEC: 0.7 RATIO
CD3+CD8+ CELLS # SPEC: 980 /UL
CD3+CD8+ CELLS NFR BLD: 39 %
CELLS.CD3-CD19+/CELLS IN BLOOD: 13 %
CHLORIDE SERPL-SCNC: 103 MMOL/L
CHOLEST SERPL-MCNC: 226 MG/DL
CO2 SERPL-SCNC: 23 MMOL/L
CREAT SERPL-MCNC: 0.5 MG/DL
EGFR: 124 ML/MIN/1.73M2
GLUCOSE SERPL-MCNC: 87 MG/DL
HCT VFR BLD CALC: 27.4 %
HDLC SERPL-MCNC: 66 MG/DL
HGB BLD-MCNC: 8.1 G/DL
HIV1 RNA # SERPL NAA+PROBE: NOT DETECTED COPIES/ML
LDLC SERPL CALC-MCNC: 152 MG/DL
MCHC RBC-ENTMCNC: 22.3 PG
MCHC RBC-ENTMCNC: 29.6 G/DL
MCV RBC AUTO: 75.5 FL
NONHDLC SERPL-MCNC: 160 MG/DL
PLATELET # BLD AUTO: 382 K/UL
PMV BLD AUTO: 0 /100 WBCS
PMV BLD: 10.4 FL
POTASSIUM SERPL-SCNC: 4.1 MMOL/L
PROT SERPL-MCNC: 7.5 G/DL
RBC # BLD: 3.63 M/UL
RBC # FLD: 15.9 %
SODIUM SERPL-SCNC: 138 MMOL/L
TRIGL SERPL-MCNC: 41 MG/DL
VIRAL LOAD INTERP: NOT DETECTED
VIRAL LOAD LOG: NOT DETECTED LOGCOPIES/ML
WBC # FLD AUTO: 5.3 K/UL

## 2024-07-17 PROCEDURE — 99204 OFFICE O/P NEW MOD 45 MIN: CPT

## 2024-07-17 PROCEDURE — 90834 PSYTX W PT 45 MINUTES: CPT

## 2024-07-17 RX ORDER — ELVITEGRAVIR, COBICISTAT, EMTRICITABINE, AND TENOFOVIR ALAFENAMIDE 150; 150; 200; 10 MG/1; MG/1; MG/1; MG/1
150-150-200-10 TABLET ORAL
Qty: 30 | Refills: 3 | Status: ACTIVE | COMMUNITY
Start: 2024-07-17 | End: 1900-01-01

## 2024-07-17 NOTE — OB PROVIDER H&P - LABOR: CERVICAL EFFACEMENT
Please order ozempic as 3 ml     Closed   7/16/2024  7:40 PM  Close reason: Other   Note from payer: Ozempic (0.25 or 0.5 MG/DOSE) 2MG/3ML Solution Pen-injector is supplied in a non-breakable package size. The quantity submitted is not a whole multiple of that non-breakable package size. Please resubmit your request with a quantity that is a multiple of the 3.000 ML.   Payer: [employer] [facility] Case ID: [id number]    [phone]    [phone]   0%

## 2024-07-18 LAB
HBV CORE IGG+IGM SER QL: NONREACTIVE
HBV SURFACE AB SERPL IA-ACNC: 351.5 MIU/ML
HBV SURFACE AG SER QL: NONREACTIVE
HCV AB SER QL: NONREACTIVE
HCV S/CO RATIO: 0.06 S/CO
T PALLIDUM AB SER QL IA: NEGATIVE

## 2024-08-28 ENCOUNTER — OUTPATIENT (OUTPATIENT)
Dept: OUTPATIENT SERVICES | Facility: HOSPITAL | Age: 39
LOS: 1 days | End: 2024-08-28
Payer: MEDICAID

## 2024-08-28 ENCOUNTER — APPOINTMENT (OUTPATIENT)
Dept: INFECTIOUS DISEASE | Facility: CLINIC | Age: 39
End: 2024-08-28

## 2024-08-28 DIAGNOSIS — B20 HUMAN IMMUNODEFICIENCY VIRUS [HIV] DISEASE: ICD-10-CM

## 2024-08-28 DIAGNOSIS — Z90.79 ACQUIRED ABSENCE OF OTHER GENITAL ORGAN(S): Chronic | ICD-10-CM

## 2024-08-28 PROCEDURE — 99213 OFFICE O/P EST LOW 20 MIN: CPT

## 2024-08-28 NOTE — ASSESSMENT
[FreeTextEntry1] : 35 minutes was spent on medical discussion re: medication compliance. On antiretroviral treatment. Patient with HIV.  Will continue antiretroviral treatment. lab results: VL<20, CD4 686; ; pharmacist consulted on FE for Hb 8.1 Immune to hep B TM 8 weeks

## 2024-08-28 NOTE — HISTORY OF PRESENT ILLNESS
[FreeTextEntry1] : Pt gave verbal consent from home to Enmanuel Aguilar MD at [facility] for telephonic visit cc: pepe lab results: VL<20, CD4 686; ; pharmacist consulted on FE for Hb 8.1 Immune to hep B TM 8 weeks

## 2024-10-08 NOTE — ED PROVIDER NOTE - NS ED ATTENDING STATEMENT MOD
Detail Level: Detailed Sunscreen Recommendations: Continual photo protection with zinc based sunscreen of at least SPF 30+  and photo protective clothing. I have personally performed a face to face diagnostic evaluation on this patient. I have reviewed the ACP note and agree with the history, exam and plan of care, except as noted.

## 2024-10-23 ENCOUNTER — OUTPATIENT (OUTPATIENT)
Dept: OUTPATIENT SERVICES | Facility: HOSPITAL | Age: 39
LOS: 1 days | End: 2024-10-23
Payer: MEDICAID

## 2024-10-23 ENCOUNTER — APPOINTMENT (OUTPATIENT)
Dept: INFECTIOUS DISEASE | Facility: CLINIC | Age: 39
End: 2024-10-23

## 2024-10-23 DIAGNOSIS — B20 HUMAN IMMUNODEFICIENCY VIRUS [HIV] DISEASE: ICD-10-CM

## 2024-10-23 PROCEDURE — 99213 OFFICE O/P EST LOW 20 MIN: CPT

## 2024-11-20 ENCOUNTER — OUTPATIENT (OUTPATIENT)
Dept: OUTPATIENT SERVICES | Facility: HOSPITAL | Age: 39
LOS: 1 days | End: 2024-11-20
Payer: COMMERCIAL

## 2024-11-20 ENCOUNTER — APPOINTMENT (OUTPATIENT)
Dept: INFECTIOUS DISEASE | Facility: CLINIC | Age: 39
End: 2024-11-20

## 2024-11-20 DIAGNOSIS — B20 HUMAN IMMUNODEFICIENCY VIRUS [HIV] DISEASE: ICD-10-CM

## 2024-11-20 DIAGNOSIS — Z21 ASYMPTOMATIC HUMAN IMMUNODEFICIENCY VIRUS [HIV] INFECTION STATUS: ICD-10-CM

## 2024-11-20 DIAGNOSIS — Z90.79 ACQUIRED ABSENCE OF OTHER GENITAL ORGAN(S): Chronic | ICD-10-CM

## 2024-11-20 PROCEDURE — 99213 OFFICE O/P EST LOW 20 MIN: CPT

## 2024-11-20 RX ORDER — ATORVASTATIN CALCIUM 20 MG/1
20 TABLET, FILM COATED ORAL
Qty: 30 | Refills: 3 | Status: ACTIVE | COMMUNITY
Start: 2024-11-20 | End: 1900-01-01

## 2024-12-10 ENCOUNTER — NON-APPOINTMENT (OUTPATIENT)
Age: 39
End: 2024-12-10

## 2024-12-18 ENCOUNTER — APPOINTMENT (OUTPATIENT)
Dept: INFECTIOUS DISEASE | Facility: CLINIC | Age: 39
End: 2024-12-18

## 2024-12-18 ENCOUNTER — OUTPATIENT (OUTPATIENT)
Dept: OUTPATIENT SERVICES | Facility: HOSPITAL | Age: 39
LOS: 1 days | End: 2024-12-18
Payer: COMMERCIAL

## 2024-12-18 DIAGNOSIS — Z21 ASYMPTOMATIC HUMAN IMMUNODEFICIENCY VIRUS [HIV] INFECTION STATUS: ICD-10-CM

## 2024-12-18 DIAGNOSIS — Z90.79 ACQUIRED ABSENCE OF OTHER GENITAL ORGAN(S): Chronic | ICD-10-CM

## 2024-12-18 PROCEDURE — 99213 OFFICE O/P EST LOW 20 MIN: CPT

## 2025-01-02 ENCOUNTER — NON-APPOINTMENT (OUTPATIENT)
Age: 40
End: 2025-01-02

## 2025-01-17 ENCOUNTER — OUTPATIENT (OUTPATIENT)
Dept: OUTPATIENT SERVICES | Facility: HOSPITAL | Age: 40
LOS: 1 days | End: 2025-01-17

## 2025-01-17 DIAGNOSIS — O98.719 HUMAN IMMUNODEFICIENCY VIRUS [HIV] DISEASE COMPLICATING PREGNANCY, UNSPECIFIED TRIMESTER: ICD-10-CM

## 2025-01-17 DIAGNOSIS — Z90.79 ACQUIRED ABSENCE OF OTHER GENITAL ORGAN(S): Chronic | ICD-10-CM

## 2025-01-18 DIAGNOSIS — O98.719 HUMAN IMMUNODEFICIENCY VIRUS [HIV] DISEASE COMPLICATING PREGNANCY, UNSPECIFIED TRIMESTER: ICD-10-CM

## 2025-02-03 ENCOUNTER — NON-APPOINTMENT (OUTPATIENT)
Age: 40
End: 2025-02-03

## 2025-02-03 ENCOUNTER — APPOINTMENT (OUTPATIENT)
Dept: INFECTIOUS DISEASE | Facility: CLINIC | Age: 40
End: 2025-02-03

## 2025-02-03 ENCOUNTER — OUTPATIENT (OUTPATIENT)
Dept: OUTPATIENT SERVICES | Facility: HOSPITAL | Age: 40
LOS: 1 days | End: 2025-02-03
Payer: COMMERCIAL

## 2025-02-03 DIAGNOSIS — Z21 ASYMPTOMATIC HUMAN IMMUNODEFICIENCY VIRUS [HIV] INFECTION STATUS: ICD-10-CM

## 2025-02-03 DIAGNOSIS — Z90.79 ACQUIRED ABSENCE OF OTHER GENITAL ORGAN(S): Chronic | ICD-10-CM

## 2025-02-03 PROCEDURE — 98014 SYNCH AUDIO-ONLY EST MOD 30: CPT

## 2025-02-13 NOTE — OB RN PATIENT PROFILE - NS_NPO_OBGYN_ALL_OB_DT
Contacted pts PCP following visit in home, left message about cont'ed insomnia, anxiety, and interest in food delivery. Pt does not want any out of pocket expense and wishes to have support covered by her insurance.    Pt is agreeble to havem a counseling session, prefers in home if possible. OT is greiving the loss of a family member as well.
22-Sep-2021 21:00

## 2025-02-14 NOTE — ED ADULT NURSE NOTE - NSSEPSISSUSPECTED_ED_A_ED
Additional History: Patient concerned with brown spots on scalp and bumps on face.  Patient has had 2 PDT treatments last year.
No

## 2025-04-02 ENCOUNTER — OUTPATIENT (OUTPATIENT)
Dept: OUTPATIENT SERVICES | Facility: HOSPITAL | Age: 40
LOS: 1 days | End: 2025-04-02

## 2025-04-02 ENCOUNTER — APPOINTMENT (OUTPATIENT)
Dept: INFECTIOUS DISEASE | Facility: CLINIC | Age: 40
End: 2025-04-02

## 2025-04-02 VITALS
RESPIRATION RATE: 14 BRPM | SYSTOLIC BLOOD PRESSURE: 109 MMHG | HEIGHT: 66 IN | BODY MASS INDEX: 29.41 KG/M2 | WEIGHT: 183 LBS | OXYGEN SATURATION: 100 % | TEMPERATURE: 97.4 F | HEART RATE: 77 BPM | DIASTOLIC BLOOD PRESSURE: 66 MMHG

## 2025-04-02 DIAGNOSIS — Z21 ASYMPTOMATIC HUMAN IMMUNODEFICIENCY VIRUS [HIV] INFECTION STATUS: ICD-10-CM

## 2025-04-02 DIAGNOSIS — Z90.79 ACQUIRED ABSENCE OF OTHER GENITAL ORGAN(S): Chronic | ICD-10-CM

## 2025-04-02 PROCEDURE — 99214 OFFICE O/P EST MOD 30 MIN: CPT

## 2025-05-18 NOTE — OB PROVIDER TRIAGE NOTE - NSGESTATIONAGE2_OBGYN_ALL_OB_NU
You can access the FollowMyHealth Patient Portal offered by [facility] by registering at the following website: [email or web address]. By joining [facility]’s FollowMyHealth portal, you will also be able to view your health information using other applications (apps) compatible with our system. 38

## 2025-06-19 ENCOUNTER — NON-APPOINTMENT (OUTPATIENT)
Age: 40
End: 2025-06-19

## 2025-08-18 ENCOUNTER — NON-APPOINTMENT (OUTPATIENT)
Age: 40
End: 2025-08-18

## 2025-09-09 ENCOUNTER — NON-APPOINTMENT (OUTPATIENT)
Age: 40
End: 2025-09-09